# Patient Record
Sex: FEMALE | Race: WHITE | NOT HISPANIC OR LATINO | Employment: UNEMPLOYED | URBAN - METROPOLITAN AREA
[De-identification: names, ages, dates, MRNs, and addresses within clinical notes are randomized per-mention and may not be internally consistent; named-entity substitution may affect disease eponyms.]

---

## 2023-04-28 ENCOUNTER — HOSPITAL ENCOUNTER (INPATIENT)
Facility: HOSPITAL | Age: 33
LOS: 2 days | Discharge: HOME | End: 2023-04-30
Attending: EMERGENCY MEDICINE | Admitting: INTERNAL MEDICINE
Payer: COMMERCIAL

## 2023-04-28 ENCOUNTER — APPOINTMENT (OUTPATIENT)
Dept: RADIOLOGY | Facility: HOSPITAL | Age: 33
End: 2023-04-28
Attending: EMERGENCY MEDICINE
Payer: COMMERCIAL

## 2023-04-28 DIAGNOSIS — J45.901 EXACERBATION OF ASTHMA, UNSPECIFIED ASTHMA SEVERITY, UNSPECIFIED WHETHER PERSISTENT: ICD-10-CM

## 2023-04-28 DIAGNOSIS — J96.01 ACUTE RESPIRATORY FAILURE WITH HYPOXIA (H): ICD-10-CM

## 2023-04-28 PROBLEM — O09.30 SUPERVISION OF HIGH-RISK PREGNANCY WITH INSUFFICIENT PRENATAL CARE: Status: ACTIVE | Noted: 2017-03-30

## 2023-04-28 LAB
ANION GAP SERPL CALCULATED.3IONS-SCNC: 12 MMOL/L (ref 7–15)
ATRIAL RATE - MUSE: 92 BPM
BUN SERPL-MCNC: 8.4 MG/DL (ref 6–20)
CALCIUM SERPL-MCNC: 9.3 MG/DL (ref 8.6–10)
CHLORIDE SERPL-SCNC: 103 MMOL/L (ref 98–107)
CREAT SERPL-MCNC: 0.61 MG/DL (ref 0.51–0.95)
D DIMER PPP FEU-MCNC: 0.41 UG/ML FEU (ref 0–0.5)
DEPRECATED HCO3 PLAS-SCNC: 25 MMOL/L (ref 22–29)
DIASTOLIC BLOOD PRESSURE - MUSE: 107 MMHG
ERYTHROCYTE [DISTWIDTH] IN BLOOD BY AUTOMATED COUNT: 13.2 % (ref 10–15)
FLUAV RNA SPEC QL NAA+PROBE: NEGATIVE
FLUBV RNA RESP QL NAA+PROBE: NEGATIVE
GFR SERPL CREATININE-BSD FRML MDRD: >90 ML/MIN/1.73M2
GLUCOSE SERPL-MCNC: 128 MG/DL (ref 70–99)
HCT VFR BLD AUTO: 48.3 % (ref 35–47)
HGB BLD-MCNC: 15.7 G/DL (ref 11.7–15.7)
INTERPRETATION ECG - MUSE: NORMAL
MCH RBC QN AUTO: 29 PG (ref 26.5–33)
MCHC RBC AUTO-ENTMCNC: 32.5 G/DL (ref 31.5–36.5)
MCV RBC AUTO: 89 FL (ref 78–100)
NT-PROBNP SERPL-MCNC: <36 PG/ML (ref 0–450)
P AXIS - MUSE: 74 DEGREES
PLATELET # BLD AUTO: 261 10E3/UL (ref 150–450)
POTASSIUM SERPL-SCNC: 4 MMOL/L (ref 3.4–5.3)
PR INTERVAL - MUSE: 138 MS
QRS DURATION - MUSE: 72 MS
QT - MUSE: 346 MS
QTC - MUSE: 427 MS
R AXIS - MUSE: 90 DEGREES
RBC # BLD AUTO: 5.42 10E6/UL (ref 3.8–5.2)
RSV RNA SPEC NAA+PROBE: NEGATIVE
SARS-COV-2 RNA RESP QL NAA+PROBE: NEGATIVE
SODIUM SERPL-SCNC: 140 MMOL/L (ref 136–145)
SYSTOLIC BLOOD PRESSURE - MUSE: 179 MMHG
T AXIS - MUSE: 39 DEGREES
VENTRICULAR RATE- MUSE: 92 BPM
WBC # BLD AUTO: 11.8 10E3/UL (ref 4–11)

## 2023-04-28 PROCEDURE — 250N000009 HC RX 250: Performed by: INTERNAL MEDICINE

## 2023-04-28 PROCEDURE — 120N000001 HC R&B MED SURG/OB

## 2023-04-28 PROCEDURE — 96374 THER/PROPH/DIAG INJ IV PUSH: CPT

## 2023-04-28 PROCEDURE — 250N000013 HC RX MED GY IP 250 OP 250 PS 637: Performed by: INTERNAL MEDICINE

## 2023-04-28 PROCEDURE — 80048 BASIC METABOLIC PNL TOTAL CA: CPT | Performed by: EMERGENCY MEDICINE

## 2023-04-28 PROCEDURE — C9803 HOPD COVID-19 SPEC COLLECT: HCPCS

## 2023-04-28 PROCEDURE — 71046 X-RAY EXAM CHEST 2 VIEWS: CPT

## 2023-04-28 PROCEDURE — 250N000011 HC RX IP 250 OP 636: Performed by: EMERGENCY MEDICINE

## 2023-04-28 PROCEDURE — 99285 EMERGENCY DEPT VISIT HI MDM: CPT | Mod: 25,CS

## 2023-04-28 PROCEDURE — 87637 SARSCOV2&INF A&B&RSV AMP PRB: CPT | Performed by: EMERGENCY MEDICINE

## 2023-04-28 PROCEDURE — 93005 ELECTROCARDIOGRAM TRACING: CPT | Performed by: EMERGENCY MEDICINE

## 2023-04-28 PROCEDURE — 250N000011 HC RX IP 250 OP 636: Performed by: INTERNAL MEDICINE

## 2023-04-28 PROCEDURE — 36415 COLL VENOUS BLD VENIPUNCTURE: CPT | Performed by: EMERGENCY MEDICINE

## 2023-04-28 PROCEDURE — 99222 1ST HOSP IP/OBS MODERATE 55: CPT | Performed by: INTERNAL MEDICINE

## 2023-04-28 PROCEDURE — 250N000009 HC RX 250: Performed by: EMERGENCY MEDICINE

## 2023-04-28 PROCEDURE — 94640 AIRWAY INHALATION TREATMENT: CPT | Mod: 76

## 2023-04-28 PROCEDURE — 83880 ASSAY OF NATRIURETIC PEPTIDE: CPT | Performed by: EMERGENCY MEDICINE

## 2023-04-28 PROCEDURE — 999N000157 HC STATISTIC RCP TIME EA 10 MIN

## 2023-04-28 PROCEDURE — 94640 AIRWAY INHALATION TREATMENT: CPT

## 2023-04-28 PROCEDURE — 85379 FIBRIN DEGRADATION QUANT: CPT | Performed by: EMERGENCY MEDICINE

## 2023-04-28 PROCEDURE — 85027 COMPLETE CBC AUTOMATED: CPT | Performed by: EMERGENCY MEDICINE

## 2023-04-28 RX ORDER — PROCHLORPERAZINE 25 MG
25 SUPPOSITORY, RECTAL RECTAL EVERY 12 HOURS PRN
Status: DISCONTINUED | OUTPATIENT
Start: 2023-04-28 | End: 2023-04-30 | Stop reason: HOSPADM

## 2023-04-28 RX ORDER — ACETAMINOPHEN 650 MG/1
650 SUPPOSITORY RECTAL EVERY 6 HOURS PRN
Status: DISCONTINUED | OUTPATIENT
Start: 2023-04-28 | End: 2023-04-30 | Stop reason: HOSPADM

## 2023-04-28 RX ORDER — IPRATROPIUM BROMIDE AND ALBUTEROL SULFATE 2.5; .5 MG/3ML; MG/3ML
3 SOLUTION RESPIRATORY (INHALATION)
Status: DISCONTINUED | OUTPATIENT
Start: 2023-04-28 | End: 2023-04-28

## 2023-04-28 RX ORDER — PREDNISONE 20 MG/1
40 TABLET ORAL
Status: DISCONTINUED | OUTPATIENT
Start: 2023-04-30 | End: 2023-04-29

## 2023-04-28 RX ORDER — IPRATROPIUM BROMIDE AND ALBUTEROL SULFATE 2.5; .5 MG/3ML; MG/3ML
3 SOLUTION RESPIRATORY (INHALATION) ONCE
Status: COMPLETED | OUTPATIENT
Start: 2023-04-28 | End: 2023-04-28

## 2023-04-28 RX ORDER — BISACODYL 10 MG
10 SUPPOSITORY, RECTAL RECTAL DAILY PRN
Status: DISCONTINUED | OUTPATIENT
Start: 2023-04-28 | End: 2023-04-30 | Stop reason: HOSPADM

## 2023-04-28 RX ORDER — ACETAMINOPHEN 325 MG/1
650 TABLET ORAL EVERY 6 HOURS PRN
Status: DISCONTINUED | OUTPATIENT
Start: 2023-04-28 | End: 2023-04-30 | Stop reason: HOSPADM

## 2023-04-28 RX ORDER — PROCHLORPERAZINE MALEATE 10 MG
10 TABLET ORAL EVERY 6 HOURS PRN
Status: DISCONTINUED | OUTPATIENT
Start: 2023-04-28 | End: 2023-04-30 | Stop reason: HOSPADM

## 2023-04-28 RX ORDER — METHYLPREDNISOLONE SODIUM SUCCINATE 125 MG/2ML
125 INJECTION, POWDER, LYOPHILIZED, FOR SOLUTION INTRAMUSCULAR; INTRAVENOUS ONCE
Status: COMPLETED | OUTPATIENT
Start: 2023-04-28 | End: 2023-04-28

## 2023-04-28 RX ORDER — LEVALBUTEROL INHALATION SOLUTION 1.25 MG/3ML
1.25 SOLUTION RESPIRATORY (INHALATION)
Status: DISCONTINUED | OUTPATIENT
Start: 2023-04-28 | End: 2023-04-29

## 2023-04-28 RX ORDER — ENOXAPARIN SODIUM 100 MG/ML
40 INJECTION SUBCUTANEOUS EVERY 24 HOURS
Status: DISCONTINUED | OUTPATIENT
Start: 2023-04-29 | End: 2023-04-30 | Stop reason: HOSPADM

## 2023-04-28 RX ORDER — METHYLPREDNISOLONE SODIUM SUCCINATE 40 MG/ML
40 INJECTION, POWDER, LYOPHILIZED, FOR SOLUTION INTRAMUSCULAR; INTRAVENOUS EVERY 8 HOURS
Status: DISCONTINUED | OUTPATIENT
Start: 2023-04-28 | End: 2023-04-29

## 2023-04-28 RX ORDER — ONDANSETRON 4 MG/1
4 TABLET, ORALLY DISINTEGRATING ORAL EVERY 6 HOURS PRN
Status: DISCONTINUED | OUTPATIENT
Start: 2023-04-28 | End: 2023-04-30 | Stop reason: HOSPADM

## 2023-04-28 RX ORDER — CLONIDINE HYDROCHLORIDE 0.1 MG/1
0.1 TABLET ORAL ONCE
Status: COMPLETED | OUTPATIENT
Start: 2023-04-28 | End: 2023-04-28

## 2023-04-28 RX ORDER — LIDOCAINE 40 MG/G
CREAM TOPICAL
Status: DISCONTINUED | OUTPATIENT
Start: 2023-04-28 | End: 2023-04-30 | Stop reason: HOSPADM

## 2023-04-28 RX ORDER — ONDANSETRON 2 MG/ML
4 INJECTION INTRAMUSCULAR; INTRAVENOUS EVERY 6 HOURS PRN
Status: DISCONTINUED | OUTPATIENT
Start: 2023-04-28 | End: 2023-04-30 | Stop reason: HOSPADM

## 2023-04-28 RX ADMIN — IPRATROPIUM BROMIDE AND ALBUTEROL SULFATE 3 ML: 2.5; .5 SOLUTION RESPIRATORY (INHALATION) at 12:04

## 2023-04-28 RX ADMIN — ACETAMINOPHEN 650 MG: 325 TABLET ORAL at 17:39

## 2023-04-28 RX ADMIN — METHYLPREDNISOLONE SODIUM SUCCINATE 125 MG: 125 INJECTION, POWDER, FOR SOLUTION INTRAMUSCULAR; INTRAVENOUS at 10:01

## 2023-04-28 RX ADMIN — IPRATROPIUM BROMIDE 0.5 MG: 0.5 SOLUTION RESPIRATORY (INHALATION) at 16:07

## 2023-04-28 RX ADMIN — IPRATROPIUM BROMIDE AND ALBUTEROL SULFATE 3 ML: .5; 3 SOLUTION RESPIRATORY (INHALATION) at 10:40

## 2023-04-28 RX ADMIN — IPRATROPIUM BROMIDE AND ALBUTEROL SULFATE 3 ML: .5; 3 SOLUTION RESPIRATORY (INHALATION) at 09:44

## 2023-04-28 RX ADMIN — LEVALBUTEROL HYDROCHLORIDE 1.25 MG: 1.25 SOLUTION RESPIRATORY (INHALATION) at 16:08

## 2023-04-28 RX ADMIN — IPRATROPIUM BROMIDE 0.5 MG: 0.5 SOLUTION RESPIRATORY (INHALATION) at 19:59

## 2023-04-28 RX ADMIN — LEVALBUTEROL HYDROCHLORIDE 1.25 MG: 1.25 SOLUTION RESPIRATORY (INHALATION) at 19:59

## 2023-04-28 RX ADMIN — CLONIDINE HYDROCHLORIDE 0.1 MG: 0.1 TABLET ORAL at 18:41

## 2023-04-28 RX ADMIN — METHYLPREDNISOLONE SODIUM SUCCINATE 40 MG: 40 INJECTION INTRAMUSCULAR; INTRAVENOUS at 17:39

## 2023-04-28 ASSESSMENT — ACTIVITIES OF DAILY LIVING (ADL)
ADLS_ACUITY_SCORE: 35
CHANGE_IN_FUNCTIONAL_STATUS_SINCE_ONSET_OF_CURRENT_ILLNESS/INJURY: NO
ADLS_ACUITY_SCORE: 35
ADLS_ACUITY_SCORE: 16
ADLS_ACUITY_SCORE: 16
WEAR_GLASSES_OR_BLIND: NO
ADLS_ACUITY_SCORE: 35
HEARING_DIFFICULTY_OR_DEAF: NO
DIFFICULTY_COMMUNICATING: NO
TRANSFERRING: 0-->INDEPENDENT
TRANSFERRING: 0-->INDEPENDENT
CONCENTRATING,_REMEMBERING_OR_MAKING_DECISIONS_DIFFICULTY: NO
WALKING_OR_CLIMBING_STAIRS_DIFFICULTY: NO
DOING_ERRANDS_INDEPENDENTLY_DIFFICULTY: NO
FALL_HISTORY_WITHIN_LAST_SIX_MONTHS: NO
ADLS_ACUITY_SCORE: 35
ADLS_ACUITY_SCORE: 35
DIFFICULTY_EATING/SWALLOWING: NO
DRESSING/BATHING_DIFFICULTY: NO
ADLS_ACUITY_SCORE: 35
TOILETING_ISSUES: NO

## 2023-04-28 NOTE — H&P
"[facility]    History and Physical - Hospitalist Service       Date of Admission:  4/28/2023    Assessment & Plan      Amanda Whiting is a 33 year old female admitted on 4/28/2023.  Presented with shortness of breath likely due to asthma exacerbation.  She is requiring supplemental oxygen in the ER.  We will continue steroids and nebs.  Patient counseled to quit smoking.  Chest x-ray is unremarkable.  D-dimer is negative    Acute respiratory failure due to asthma exacerbation  -- Continue supplemental oxygen, steroids and nebs  -- Patient counseled to quit smoking  -- Chest x-ray and D-dimer are unremarkable    Current smoker  --Patient counseled to quit smoking     Diet: Combination Diet Regular Diet Adult  DVT Prophylaxis: Low Risk/Ambulatory with no VTE prophylaxis indicated  Nam Catheter: Not present  Lines: None     Cardiac Monitoring: None  Code Status: Full Code    Clinically Significant Risk Factors Present on Admission                       # Obesity: Estimated body mass index is 32.28 kg/m  as calculated from the following:    Height as of this encounter: 1.676 m (5' 6\").    Weight as of this encounter: 90.7 kg (200 lb).           Disposition Plan      Expected Discharge Date: 04/30/2023                  Yosi Rodriguez MD  Hospitalist Service  [facility]  Securely message with Swivel (more info)  Text page via [employer] Paging/Directory     ______________________________________________________________________    Chief Complaint   Shortness of breath for 3 days    History is obtained from the patient    History of Present Illness   Amanda Whiting is a 33 year old female with known asthma and smoking history not on any medications at home presented with 3-day history of worsening shortness of breath.  Patient was hypoxic in the ER and was put on supplemental oxygen.  Patient is trying to cut down on her smoking.  Patient was given nebs and steroids in the " ER  Chest x-ray was unremarkable for pneumonia.  D-dimer was negative      Past Medical History    Past Medical History:   Diagnosis Date     Uncomplicated asthma        Past Surgical History   History reviewed. No pertinent surgical history.    Prior to Admission Medications   None        Social History   I have reviewed this patient's social history and updated it with pertinent information if needed.        Physical Exam   Vital Signs: Temp: 98.7  F (37.1  C) Temp src: Oral BP: (!) 149/92 Pulse: 104   Resp: 28 SpO2: 93 % O2 Device: Nasal cannula Oxygen Delivery: 2 LPM  Weight: 200 lbs 0 oz    Flat affect  Able to speak full sentences  Bilateral wheezing  S1-S2 normal  No leg edema  Moves all 4 extremities  No skin rash    Medical Decision Making       60 MINUTES SPENT BY ME on the date of service doing chart review, history, exam, documentation & further activities per the note.  MANAGEMENT DISCUSSED with the following over the past 24 hours: Patient   NOTE(S)/MEDICAL RECORDS REVIEWED over the past 24 hours: ER course      Data     I have personally reviewed the following data over the past 24 hrs:    11.8 (H)  \   15.7   / 261     140 103 8.4 /  128 (H)   4.0 25 0.61 \       Trop: N/A BNP: <36       INR:  N/A PTT:  N/A   D-dimer:  0.41 Fibrinogen:  N/A       Imaging results reviewed over the past 24 hrs:   Recent Results (from the past 24 hour(s))   Chest XR,  PA & LAT    Narrative    EXAM: XR CHEST 2 VIEWS  LOCATION: [facility]  DATE/TIME: 4/28/2023 10:28 AM CDT    INDICATION: sob cough  COMPARISON: None.      Impression    IMPRESSION: Lungs are clear. Heart and pulmonary vascularity are normal. No signs of acute disease.

## 2023-04-28 NOTE — PHARMACY-ADMISSION MEDICATION HISTORY
Pharmacist Admission Medication History    Admission medication history is complete. The information provided in this note is only as accurate as the sources available at the time of the update.    Medication reconciliation/reorder completed by provider prior to medication history? No    Information Source(s): Patient via in-person    Pertinent Information:     Changes made to PTA medication list:    Added: None    Deleted: None    Changed: None    Medication Affordability:  Not including over the counter (OTC) medications, was there a time in the past 12 months when you did not take your medications as prescribed because of cost?: No    Allergies reviewed with patient and updates made in EHR: yes    Medication History Completed By: Darcy Griffiths RPH 4/28/2023 12:23 PM    Prior to Admission medications    Not on File

## 2023-04-28 NOTE — PROGRESS NOTES
RESPIRATORY CARE NOTE     Patient Name: Amanda Whiting  Today's Date: 4/28/2023       Pt has been seen for neb treatment and respiratory assessment. BS: fainted wheezes auscultated upper airways and decreased @ bases. Pt was given neb tx as ordered, no undesired effects noted. Post neb tx, pt reported relief. RT will continue to follow and assess as needed.     Raquel Boyle RRT

## 2023-04-28 NOTE — PLAN OF CARE
Goal Outcome Evaluation:      Plan of Care Reviewed With: patient      Problem: Asthma Exacerbation  Goal: Asthma Symptom Relief  Outcome: Progressing   Pt. On room air and states she does not feel short of breath when getting up to the bathroom.   Pt. Declined as needed nicorette.   As needed tylenol requested for headache and chest pain from coughing. When giving tylenol, pt. Then stated that she didn't have any pain.     Tiffany Renteria RN

## 2023-04-28 NOTE — ED TRIAGE NOTES
Pt brought in by EMS. Pt c/o of shortness of breath with dry cough for 2 to 3 days. States she had sore throat prior to having sob. Hx of asthma.     Triage Assessment     Row Name 04/28/23 0824       Triage Assessment (Adult)    Airway WDL WDL       Respiratory WDL    Respiratory WDL cough;rhythm/pattern    Rhythm/Pattern, Respiratory shortness of breath    Cough Frequency frequent    Cough Type dry       Skin Circulation/Temperature WDL    Skin Circulation/Temperature WDL WDL       Cardiac WDL    Cardiac WDL chest pain       Chest Pain Assessment    Chest Pain Location epigastric    Character aching    Precipitating Factors at rest    Chest Pain Intervention 12-lead ECG obtained       Peripheral/Neurovascular WDL    Peripheral Neurovascular WDL WDL       Cognitive/Neuro/Behavioral WDL    Cognitive/Neuro/Behavioral WDL WDL

## 2023-04-28 NOTE — ED PROVIDER NOTES
EMERGENCY DEPARTMENT ENCOUNTER      NAME: Amanda Whiting  AGE: 33 year old female  YOB: 1990  MRN: [id number]  EVALUATION DATE & TIME: 4/28/2023  8:21 AM    PCP: No primary care provider on file.    ED PROVIDER: Feli Milner MD    Chief Complaint   Patient presents with     Shortness of Breath         FINAL IMPRESSION:  1. Acute respiratory failure with hypoxia (H)    2. Exacerbation of asthma, unspecified asthma severity, unspecified whether persistent          ED COURSE & MEDICAL DECISION MAKING:    Pertinent Labs & Imaging studies reviewed. (See chart for details)  33 year old female with history of asthma who presents to the Emergency Department for evaluation of 2 to 3 days of shortness of breath, primarily dry nonproductive cough.  Differential includes viral syndrome, influenza, COVID-19, asthma exacerbation, pneumonia, pulmonary embolism and less likely new onset heart failure, symptomatic anemia or arrhythmia.    Patient placed on monitor, IV established and blood obtained.  Twelve-lead EKG shows sinus rhythm.  No ischemic changes.  Patient given Solu-Medrol, nebs x3.  With this, became hypoxic 88%.  Requiring 2 L nasal cannula.  COVID/influenza/RSV swab negative.  CBC, BMP, D-dimer and BNP unremarkable.  Chest x-ray unremarkable without infiltrate.  Patient had frankly little improvement with the above nebs, still symptomatic and hypoxic and warrants admission for further management of asthma exacerbation and hypoxic respiratory failure.      ED Course as of 04/28/23 1148   Fri Apr 28, 2023   0825 8:25 AM I met with the patient to obtain patient history and performed a physical exam. Discussed plan for ED work up including potential diagnostic studies and interventions.   0830 Attempted to reach motherIdania via phone to get more details about family history of possible clotting.  Did not answer, unable to leave voicemail.   0924 D-Dimer Quantitative: 0.41   1019 SpO2(!): 88 %   1034  Chest XR,  PA & LAT  Chest x-ray reviewed by myself.  No cardiomegaly, pleural effusion, infiltrate   1103 David hypoxic after nebs.  No significant improvement.  No beds in system, will board here.   1127 11:27 AM Spoke with hospitalist Dr. Rodriguez who accepts patient for admission.       Medical Decision Making    History:    Supplemental history from: EMS    External Record(s) reviewed: [facility] Record: [facility]    Work Up:    Chart documentation includes differential considered and any EKGs or imaging independently interpreted by provider, where specified.    In additional to work up documented, I considered the following work up: na    External consultation:    Discussion of management with another provider: Hospitalist Dr. Rodriguez    Complicating factors:    Care impacted by chronic illness: Chronic Lung Disease    Care affected by social determinants of health: Access to Medical Care    Disposition considerations: Admit.        At the conclusion of the encounter I discussed the results of all of the tests and the disposition. The questions were answered. The patient or family acknowledged understanding and was agreeable with the care plan.    CRITICAL CARE:  Critical Care  Performed by: Feli Milner MD  Authorized by: Feli Milner MD     Total critical care time: 42 minutes  Criticalcare time was exclusive of separately billable procedures and treating other patients.  Critical care was necessary to treat or prevent imminent or life-threatening deterioration of the following conditions: Hypoxic respiratory failure  Critical care was time spent personally by me on the following activities: development of treatment plan with patient or surrogate, discussions with consultants, examination of patient, evaluation of patient's response totreatment, obtaining history from patient or surrogate, ordering and performing treatments and interventions, ordering and review of laboratory studies, ordering  and review of radiographic studies and re-evaluation ofpatient's condition, this excludes any separately billable procedures.      MEDICATIONS GIVEN IN THE EMERGENCY:  Medications   ipratropium - albuterol 0.5 mg/2.5 mg/3 mL (DUONEB) neb solution 3 mL (has no administration in time range)   ipratropium - albuterol 0.5 mg/2.5 mg/3 mL (DUONEB) neb solution 3 mL (3 mLs Nebulization $Given 4/28/23 0966)   methylPREDNISolone sodium succinate (solu-MEDROL) injection 125 mg (125 mg Intravenous $Given 4/28/23 1001)   ipratropium - albuterol 0.5 mg/2.5 mg/3 mL (DUONEB) neb solution 3 mL (3 mLs Nebulization $Given 4/28/23 1040)       NEW PRESCRIPTIONS STARTED AT TODAY'S ER VISIT  New Prescriptions    No medications on file          =================================================================    HPI    Patient information was obtained from: patient    Use of Intrepreter: N/A        Amanda Whiting is a 33 year old female with pertinent medical history of uncomplicated asthma who presents shortness of breath.    Patient reports worsening shortness of breath for the past 3 days. She states that the shortness of breath started with sore throat. She says it hurts with inspiration. She also associates cough with production of phlegm (yellow and clear) and congestion.    She denies associating hemoptysis, fever, and chills. She denies recent sick contacts. She has no IUD. She has no personal history of blood clots or bleeding disorders. Her children are at school age.    There were no other concerns/complaints at this time.    FHx: Her mother has history of blood clots.      PAST MEDICAL HISTORY:  Past Medical History:   Diagnosis Date     Uncomplicated asthma        PAST SURGICAL HISTORY:  History reviewed. No pertinent surgical history.    CURRENT MEDICATIONS:    None       ALLERGIES:  Allergies   Allergen Reactions     Bee Pollens [Bee Pollen] Unknown     Bee Venom      BREATHING DIFFICULTY     Penicillins Unknown  "      FAMILY HISTORY:  History reviewed. No pertinent family history.    SOCIAL HISTORY:        VITALS:  Patient Vitals for the past 24 hrs:   BP Temp Temp src Pulse Resp SpO2 Height Weight   04/28/23 1133 -- -- -- 98 27 92 % -- --   04/28/23 1130 (!) 149/94 -- -- 96 23 92 % -- --   04/28/23 1055 -- -- -- 113 29 92 % -- --   04/28/23 1045 138/82 -- -- 104 -- -- -- --   04/28/23 1044 -- -- -- 103 -- 91 % -- --   04/28/23 1043 -- -- -- 104 22 96 % -- --   04/28/23 1034 -- -- -- 90 13 94 % -- --   04/28/23 1020 -- -- -- 98 17 92 % -- --   04/28/23 1015 (!) 164/115 -- -- 92 23 (!) 88 % -- --   04/28/23 1000 (!) 163/119 -- -- 97 23 91 % -- --   04/28/23 0955 -- -- -- 97 28 94 % -- --   04/28/23 0952 -- -- -- 87 21 100 % -- --   04/28/23 0944 (!) 164/118 -- -- 93 22 92 % -- --   04/28/23 0930 (!) 150/112 -- -- 91 26 91 % -- --   04/28/23 0915 (!) 152/105 -- -- 98 29 91 % -- --   04/28/23 0900 (!) 151/106 -- -- 89 20 91 % -- --   04/28/23 0823 (!) 179/107 98.7  F (37.1  C) Oral 96 20 93 % 1.676 m (5' 6\") 90.7 kg (200 lb)       PHYSICAL EXAM    General Appearance: Well-appearing, well-nourished, no acute distress   ENT:  Lips, mucosa, and tongue normal; membranes are moist without pallor. No tonsillar swelling, erythema, or exudates.  Chest:  No tenderness or deformity, no crepitus  Cardio: Hypertensive. Regular rhythm, no murmur/gallop/rub, 2+ pulses symmetric in all extremities  Pulm: Borderline hypoxic in 90-93%, harsh cough, minimal wheezing.  Mild respiratory distress  Abdomen:  Soft, obese  Extremities:  Extremities normal, atraumatic, no cyanosis or no peripheral edema, full ROM and motor tone intact, bilateral pulses intact upper and lower  Skin:  Skin warm, dry, no rashes  Neuro:  Alert and oriented ×3     RADIOLOGY/LABS:  Reviewed all pertinent imaging. Please see official radiology report. All pertinent labs reviewed and interpreted.    Results for orders placed or performed during the hospital encounter of " 04/28/23   Chest XR,  PA & LAT    Impression    IMPRESSION: Lungs are clear. Heart and pulmonary vascularity are normal. No signs of acute disease.   Symptomatic Influenza A/B, RSV, & SARS-CoV2 PCR (COVID-19) Nasopharyngeal    Specimen: Nasopharyngeal; Swab   Result Value Ref Range    Influenza A PCR Negative Negative    Influenza B PCR Negative Negative    RSV PCR Negative Negative    SARS CoV2 PCR Negative Negative   CBC (+ platelets, no diff)   Result Value Ref Range    WBC Count 11.8 (H) 4.0 - 11.0 10e3/uL    RBC Count 5.42 (H) 3.80 - 5.20 10e6/uL    Hemoglobin 15.7 11.7 - 15.7 g/dL    Hematocrit 48.3 (H) 35.0 - 47.0 %    MCV 89 78 - 100 fL    MCH 29.0 26.5 - 33.0 pg    MCHC 32.5 31.5 - 36.5 g/dL    RDW 13.2 10.0 - 15.0 %    Platelet Count 261 150 - 450 10e3/uL   Basic metabolic panel   Result Value Ref Range    Sodium 140 136 - 145 mmol/L    Potassium 4.0 3.4 - 5.3 mmol/L    Chloride 103 98 - 107 mmol/L    Carbon Dioxide (CO2) 25 22 - 29 mmol/L    Anion Gap 12 7 - 15 mmol/L    Urea Nitrogen 8.4 6.0 - 20.0 mg/dL    Creatinine 0.61 0.51 - 0.95 mg/dL    Calcium 9.3 8.6 - 10.0 mg/dL    Glucose 128 (H) 70 - 99 mg/dL    GFR Estimate >90 >60 mL/min/1.73m2   D dimer quantitative   Result Value Ref Range    D-Dimer Quantitative 0.41 0.00 - 0.50 ug/mL FEU   Nt probnp inpatient   Result Value Ref Range    N terminal Pro BNP Inpatient <36 0 - 450 pg/mL   ECG 12-LEAD WITH MUSE (LHE)   Result Value Ref Range    Systolic Blood Pressure 179 mmHg    Diastolic Blood Pressure 107 mmHg    Ventricular Rate 92 BPM    Atrial Rate 92 BPM    OK Interval 138 ms    QRS Duration 72 ms     ms    QTc 427 ms    P Axis 74 degrees    R AXIS 90 degrees    T Axis 39 degrees    Interpretation ECG       Sinus rhythm  Rightward axis  Borderline ECG  No previous ECGs available  Confirmed by SEE ED PROVIDER NOTE FOR, ECG INTERPRETATION ([id number]),  SIENA RINCON ([id number]) on 4/28/2023 11:03:52 AM         EKG:  Performed at: 4/28/2023  08:24:10    Impression: Sinus rhythm, Rightward axis, Borderline ECG    Rate: 92  Rhythm: Sinus Rhythm  Axis: 90  WI Interval: 138  QRS Interval: 72  QTc Interval: 427  ST Changes: None  Comparison: No previous ECG for comparison  I have independently reviewed and interpreted the EKG(s) documented above.      The creation of this record is based on the scribe s observations of the work being performed by Feli Milner MD and the provider s statements to them. It was created on her behalf by Jennifer Kuo, a trained medical scribe. This document has been checked and approved by the attending provider.    Feli Milner MD  Emergency Medicine  [facility] EMERGENCY DEPARTMENT  [facility] [facility] [id number] [phone]  Dept: [phone]     Feli Milner MD  04/28/23 7028

## 2023-04-28 NOTE — ED NOTES
Bed: [place] [address]  Expected date:   Expected time:   Means of arrival: Ambulance  Comments:  SPF: SOB, cough, sore throat

## 2023-04-29 PROCEDURE — 250N000012 HC RX MED GY IP 250 OP 636 PS 637: Performed by: INTERNAL MEDICINE

## 2023-04-29 PROCEDURE — 120N000001 HC R&B MED SURG/OB

## 2023-04-29 PROCEDURE — 999N000157 HC STATISTIC RCP TIME EA 10 MIN

## 2023-04-29 PROCEDURE — 94640 AIRWAY INHALATION TREATMENT: CPT | Mod: 76

## 2023-04-29 PROCEDURE — 250N000013 HC RX MED GY IP 250 OP 250 PS 637: Performed by: INTERNAL MEDICINE

## 2023-04-29 PROCEDURE — 250N000011 HC RX IP 250 OP 636: Performed by: INTERNAL MEDICINE

## 2023-04-29 PROCEDURE — 99232 SBSQ HOSP IP/OBS MODERATE 35: CPT | Performed by: INTERNAL MEDICINE

## 2023-04-29 PROCEDURE — 250N000009 HC RX 250: Performed by: INTERNAL MEDICINE

## 2023-04-29 RX ORDER — LEVALBUTEROL INHALATION SOLUTION 1.25 MG/3ML
1.25 SOLUTION RESPIRATORY (INHALATION)
Status: DISCONTINUED | OUTPATIENT
Start: 2023-04-29 | End: 2023-04-29

## 2023-04-29 RX ORDER — LEVALBUTEROL INHALATION SOLUTION 1.25 MG/3ML
1.25 SOLUTION RESPIRATORY (INHALATION) 3 TIMES DAILY
Status: DISCONTINUED | OUTPATIENT
Start: 2023-04-29 | End: 2023-04-30 | Stop reason: HOSPADM

## 2023-04-29 RX ORDER — PREDNISONE 20 MG/1
40 TABLET ORAL DAILY
Status: DISCONTINUED | OUTPATIENT
Start: 2023-04-29 | End: 2023-04-30 | Stop reason: HOSPADM

## 2023-04-29 RX ADMIN — METHYLPREDNISOLONE SODIUM SUCCINATE 40 MG: 40 INJECTION INTRAMUSCULAR; INTRAVENOUS at 03:32

## 2023-04-29 RX ADMIN — PREDNISONE 40 MG: 20 TABLET ORAL at 12:28

## 2023-04-29 RX ADMIN — IPRATROPIUM BROMIDE 0.5 MG: 0.5 SOLUTION RESPIRATORY (INHALATION) at 09:23

## 2023-04-29 RX ADMIN — LEVALBUTEROL HYDROCHLORIDE 1.25 MG: 1.25 SOLUTION RESPIRATORY (INHALATION) at 20:21

## 2023-04-29 RX ADMIN — LEVALBUTEROL HYDROCHLORIDE 1.25 MG: 1.25 SOLUTION RESPIRATORY (INHALATION) at 09:23

## 2023-04-29 RX ADMIN — ACETAMINOPHEN 650 MG: 325 TABLET ORAL at 16:42

## 2023-04-29 RX ADMIN — ACETAMINOPHEN 650 MG: 325 TABLET ORAL at 08:13

## 2023-04-29 RX ADMIN — ENOXAPARIN SODIUM 40 MG: 40 INJECTION SUBCUTANEOUS at 08:13

## 2023-04-29 RX ADMIN — LEVALBUTEROL HYDROCHLORIDE 1.25 MG: 1.25 SOLUTION RESPIRATORY (INHALATION) at 14:01

## 2023-04-29 ASSESSMENT — ACTIVITIES OF DAILY LIVING (ADL)
ADLS_ACUITY_SCORE: 16
DEPENDENT_IADLS:: TRANSPORTATION
ADLS_ACUITY_SCORE: 16

## 2023-04-29 NOTE — DISCHARGE INSTRUCTIONS
Appointment for follow up   [place] [address]  [phone]  5/23/23 at 2:40PM with Sandra Marte  Please call and cancel if you cannot make this appointment.  Thanks Care Management

## 2023-04-29 NOTE — PROGRESS NOTES
"RESPIRATORY CARE NOTE    Xopenex and Atrovent given X2 on schedule.  Breath sounds: scattered expiratory wheezes; increased air movement post Tx.  Peak flow 220 pre and post Tx.  Pt is on room air.      Blood pressure (!) 170/112, pulse 84, temperature 98.4  F (36.9  C), temperature source Oral, resp. rate 20, height 1.702 m (5' 7\"), weight 102 kg (224 lb 13.9 oz), SpO2 91 %.    Paul Aguirre, RT    "

## 2023-04-29 NOTE — CONSULTS
Care Management Initial Consult    General Information  Assessment completed with: Patient,         Primary Care Provider verified and updated as needed: Yes   Readmission within the last 30 days: no previous admission in last 30 days      Reason for Consult: discharge planning  Advance Care Planning:            Communication Assessment  Patient's communication style: spoken language (English or Bilingual)    Hearing Difficulty or Deaf: no   Wear Glasses or Blind: no    Cognitive  Cognitive/Neuro/Behavioral: WDL                      Living Environment:   People in home: other (see comments)     Current living Arrangements: homeless, house      Able to return to prior arrangements: yes       Family/Social Support:  Care provided by: self  Provides care for: no one                Description of Support System:           Current Resources:   Patient receiving home care services: No     Community Resources: None  Equipment currently used at home: none  Supplies currently used at home: None    Employment/Financial:  Employment Status: unemployed        Financial Concerns: insurance, none, unemployed   Referral to Financial Worker: Yes       Does the patient's insurance plan have a 3 day qualifying hospital stay waiver?  No    Lifestyle & Psychosocial Needs:  Social Determinants of Health     Tobacco Use: Not on file   Alcohol Use: Not on file   Financial Resource Strain: Not on file   Food Insecurity: Not on file   Transportation Needs: Not on file   Physical Activity: Not on file   Stress: Not on file   Social Connections: Not on file   Intimate Partner Violence: Not on file   Depression: Not on file   Housing Stability: Not on file       Functional Status:  Prior to admission patient needed assistance:   Dependent ADLs:: Independent  Dependent IADLs:: Transportation       Mental Health Status:          Chemical Dependency Status:                Values/Beliefs:  Spiritual, Cultural Beliefs, Buddhism Practices, Values  that affect care:                 Additional Information:  Assessed with pt. She is currently staying with her mom in [facility], however she has periods of homelessness. She has 3 kids ages 5,10,12 that are under the care of her mother. Reports no health insurance and reports is going to fill out the MA application. Plan is to discharge to her mothers house. Declines any chemical dependency history or needs/resources. Declines any mental health resources or needs.     Provider requested we set up follow up appointment for patient.  CM discussed this with pt and she was in agreement. CM looked though resources for low cost clinics in pt area and found one 2 miles from pt mothers home. Called and scheduled an appointment for the pt. Pt is accepting of this, directions given and pt understands if she cannot make the appointment she needs to call and cancel.    Appointment for follow up   [place] [address] [address] in [place]  [phone]  5/23/23 at 2:40PM with Sandra Marte  Please call and cancel if you cannot make this appointment.    RNCM to follow for medical progression, recommendations, and final discharge plan.        Joelle Carranza RN

## 2023-04-29 NOTE — PLAN OF CARE
Goal Outcome Evaluation:    Pt c/o of headache 7/10. Tylenol administered bringing pain down to a 5/10. Pt A&O x4, Independent in the room. Infrequent congested non productive cough observed. Pt reports some SOB. Transitioned to PO steroids. VSS RA      Problem: Plan of Care - These are the overarching goals to be used throughout the patient stay.    Goal: Absence of Hospital-Acquired Illness or Injury  Intervention: Prevent Skin Injury  Recent Flowsheet Documentation  Taken 4/29/2023 0808 by Yany Armendariz RN  Body Position: position changed independently     Problem: Plan of Care - These are the overarching goals to be used throughout the patient stay.    Goal: Optimal Comfort and Wellbeing  Outcome: Progressing     Problem: Asthma Exacerbation  Goal: Asthma Symptom Relief  Outcome: Progressing     Problem: Asthma Exacerbation  Goal: Asthma Symptom Relief  Intervention: Support Asthma Symptom Control  Recent Flowsheet Documentation  Taken 4/29/2023 0808 by Yany Armendariz, RN  Medication Review/Management: medications reviewed           Plan of Care Reviewed With: patient    Overall Patient Progress: improvingOverall Patient Progress: improving

## 2023-04-29 NOTE — PROGRESS NOTES
"[facility]    Medicine Progress Note - Hospitalist Service    Date of Admission:  4/28/2023    Assessment & Plan      Amanda Whiting is a 33 year old female admitted on 4/28/2023.  Presented with shortness of breath likely due to asthma exacerbation.  She is requiring supplemental oxygen in the ER.  We will continue steroids and nebs.  Patient counseled to quit smoking.  Chest x-ray is unremarkable.  D-dimer is negative    Acute respiratory failure due to asthma exacerbation  --Improving  -- Continue supplemental oxygen, IV steroids and nebs.  Switch to p.o. steroids today.  Switch nebs to every 6 hours.  -- Patient counseled to quit smoking  -- Chest x-ray and D-dimer are unremarkable    Accelerated hypertension due to steroids  -- Did receive p.o. clonidine last night  -- Switch to p.o. steroids today    Current smoker  --Patient counseled to quit smoking       Diet: Combination Diet Regular Diet Adult    DVT Prophylaxis: Enoxaparin (Lovenox) SQ  Nam Catheter: Not present  Lines: None     Cardiac Monitoring: None  Code Status: Full Code      Clinically Significant Risk Factors                        # Obesity: Estimated body mass index is 35.22 kg/m  as calculated from the following:    Height as of this encounter: 1.702 m (5' 7\").    Weight as of this encounter: 102 kg (224 lb 13.9 oz)., PRESENT ON ADMISSION         Disposition Plan     Expected Discharge Date: 04/30/2023      Destination: home with family            Yosi Rodriguez MD  Hospitalist Service  [facility]  Securely message with [employer] (more info)  Text page via [employer] Paging/Directory   ______________________________________________________________________    Interval History   Patient is off supplemental oxygen.  Blood pressure was high.  Does not take inhaled steroids or sees a primary doctor.  Discussed with  about financial resources.  She is able to walk to the bathroom without " shortness of breath    Physical Exam   Vital Signs: Temp: 97.7  F (36.5  C) Temp src: Oral BP: 138/84 Pulse: 90   Resp: 18 SpO2: 91 % O2 Device: None (Room air) Oxygen Delivery: 2 LPM  Weight: 224 lbs 13.91 oz    Does not appear to be in distress  Minimal wheezing at the bases  Able to speak full sentences  Medical Decision Making       35 MINUTES SPENT BY ME on the date of service doing chart review, history, exam, documentation & further activities per the note.  MANAGEMENT DISCUSSED with the following over the past 24 hours: Patient   NOTE(S)/MEDICAL RECORDS REVIEWED over the past 24 hours: Nursing notes      Data         Imaging results reviewed over the past 24 hrs:   No results found for this or any previous visit (from the past 24 hour(s)).

## 2023-04-29 NOTE — PLAN OF CARE
Problem: Plan of Care - These are the overarching goals to be used throughout the patient stay.    Goal: Absence of Hospital-Acquired Illness or Injury  Intervention: Identify and Manage Fall Risk  Recent Flowsheet Documentation  Taken 4/29/2023 1642 by Eileen Kevin RN  Safety Promotion/Fall Prevention: nonskid shoes/slippers when out of bed     Problem: Plan of Care - These are the overarching goals to be used throughout the patient stay.    Goal: Absence of Hospital-Acquired Illness or Injury  Intervention: Prevent Skin Injury  Recent Flowsheet Documentation  Taken 4/29/2023 1642 by Eileen Kevin RN  Body Position: position changed independently     Problem: Asthma Exacerbation  Goal: Asthma Symptom Relief  Intervention: Support Asthma Symptom Control  Recent Flowsheet Documentation  Taken 4/29/2023 1642 by Eileen Kevni RN  Medication Review/Management: medications reviewed   Goal Outcome Evaluation:       Pt is progressing with these goals.  Pt states that her symptoms have improved since admit.  VSS.  Will continue to monitor.

## 2023-04-29 NOTE — PLAN OF CARE
"  Problem: Asthma Exacerbation  Goal: Asthma Symptom Relief  Outcome: Progressing  Intervention: Support Asthma Symptom Control  Recent Flowsheet Documentation  Taken 4/28/2023 2235 by Meg Hernandez, RN  Medication Review/Management: medications reviewed   Goal Outcome Evaluation: Patient aox4. Ambulating independently. Voiding w/o concerns. Sats 93 on RA. BP does remain elevated at this time. Lung sounds diminished posteriorly and has an infrequent non productive cough. Did deny any chest pain and sob overnight but reports having a 'scratchy throat\" after receiving her last scheduled  neb. Also receiving IV steroids.                     "

## 2023-04-29 NOTE — PROGRESS NOTES
Report given to nurse on P1. Pt. Transferred to floor by wheelchair with belongings.     Tiffany Renteria RN

## 2023-04-30 VITALS
RESPIRATION RATE: 18 BRPM | HEIGHT: 67 IN | BODY MASS INDEX: 35.36 KG/M2 | DIASTOLIC BLOOD PRESSURE: 91 MMHG | OXYGEN SATURATION: 94 % | WEIGHT: 225.31 LBS | TEMPERATURE: 97.8 F | HEART RATE: 72 BPM | SYSTOLIC BLOOD PRESSURE: 126 MMHG

## 2023-04-30 PROCEDURE — 250N000009 HC RX 250: Performed by: INTERNAL MEDICINE

## 2023-04-30 PROCEDURE — 250N000013 HC RX MED GY IP 250 OP 250 PS 637: Performed by: INTERNAL MEDICINE

## 2023-04-30 PROCEDURE — 250N000012 HC RX MED GY IP 250 OP 636 PS 637: Performed by: INTERNAL MEDICINE

## 2023-04-30 PROCEDURE — 999N000157 HC STATISTIC RCP TIME EA 10 MIN

## 2023-04-30 PROCEDURE — 94640 AIRWAY INHALATION TREATMENT: CPT | Mod: 76

## 2023-04-30 PROCEDURE — G0463 HOSPITAL OUTPT CLINIC VISIT: HCPCS

## 2023-04-30 PROCEDURE — 99239 HOSP IP/OBS DSCHRG MGMT >30: CPT | Performed by: INTERNAL MEDICINE

## 2023-04-30 RX ORDER — ALBUTEROL SULFATE 90 UG/1
2 AEROSOL, METERED RESPIRATORY (INHALATION) EVERY 6 HOURS PRN
Qty: 18 G | Refills: 1 | Status: SHIPPED | OUTPATIENT
Start: 2023-04-30

## 2023-04-30 RX ORDER — PREDNISONE 10 MG/1
TABLET ORAL
Qty: 30 TABLET | Refills: 0 | Status: SHIPPED | OUTPATIENT
Start: 2023-04-30 | End: 2023-05-12

## 2023-04-30 RX ORDER — FLUTICASONE PROPIONATE 50 MCG
2 BLISTER, WITH INHALATION DEVICE INHALATION EVERY 12 HOURS
Qty: 60 EACH | Refills: 1 | Status: SHIPPED | OUTPATIENT
Start: 2023-05-12

## 2023-04-30 RX ADMIN — ACETAMINOPHEN 650 MG: 325 TABLET ORAL at 00:08

## 2023-04-30 RX ADMIN — PREDNISONE 40 MG: 20 TABLET ORAL at 10:44

## 2023-04-30 RX ADMIN — LEVALBUTEROL HYDROCHLORIDE 1.25 MG: 1.25 SOLUTION RESPIRATORY (INHALATION) at 08:46

## 2023-04-30 ASSESSMENT — ACTIVITIES OF DAILY LIVING (ADL)
ADLS_ACUITY_SCORE: 16

## 2023-04-30 NOTE — PLAN OF CARE
Problem: Plan of Care - These are the overarching goals to be used throughout the patient stay.    Goal: Plan of Care Review  Description: The Plan of Care Review/Shift note should be completed every shift.  The Outcome Evaluation is a brief statement about your assessment that the patient is improving, declining, or no change.  This information will be displayed automatically on your shift note.  Outcome: Adequate for Care Transition   Goal Outcome Evaluation:             Patient continues to progress towards goals.   VSS.     Transitioned to oral steroids yesterday.   Scheduled nebs. Denies sob and remains on RA, maintaining sats >90.     MD has ordered for the pt to go home this afternoon. She is nervous about going home today.   Her affect is very flat and she stated that she feels worse today then yesterday. When this writer asked the pt why she had not communicated this to the MD when he was in the room with her this am, she stated that she didn't know why.    Pt will have a cab voucher ride when we are able to set that up. She is going to her moms house she says where her kids are bc she is homeless.    Pharm called and stated that they do not have any pulmoncort in stock and needed MD to approve a substitute. This writer contacted the MD and he sent a new order for the substitute. Will await medications arrival from the pharmacy so pt can discharge.    PIV will be removed upon discharge to home.    Did discuss with the care manager pt's demeanor and that she has a very flat affect as well as seems depressed. She stated that she would add care coordinator to check in on the pt after discharge.    Will cont to monitor this pt until she discharges.

## 2023-04-30 NOTE — PLAN OF CARE
Problem: Plan of Care - These are the overarching goals to be used throughout the patient stay.    Goal: Optimal Comfort and Wellbeing  Outcome: Progressing  Intervention: Monitor Pain and Promote Comfort  Recent Flowsheet Documentation  Taken 4/29/2023 1918 by Meg Hernandez RN  Pain Management Interventions: medication (see MAR)     Problem: Asthma Exacerbation  Goal: Asthma Symptom Relief  Outcome: Progressing   Goal Outcome Evaluation: Patient continues to progress towards goals. VSS.   Transitioned to oral steroids yesterday. Scheduled nebs. Denies sob and remains on RA, maintaining sats >90. Showered last night. Complained of HA overnight, PRN tylenol given with some relief.

## 2023-04-30 NOTE — PROGRESS NOTES
Care Management Discharge Note    Discharge Date: 04/30/2023       Discharge Disposition:  home    Discharge Services:      Discharge DME:      Discharge Transportation: family or friend will provide    Private pay costs discussed: Not applicable    PAS Confirmation Code:    Patient/family educated on Medicare website which has current facility and service quality ratings:      Education Provided on the Discharge Plan:    Persons Notified of Discharge Plans: yes  Patient/Family in Agreement with the Plan:      Handoff Referral Completed: Yes    Additional Information:  Pt adequate for discharge. Nursing to let CM know when pt is ready and CM to call for a cab.        Joelle Carranza RN

## 2023-04-30 NOTE — DISCHARGE SUMMARY
[facility]  Hospitalist Discharge Summary      Date of Admission:  4/28/2023  Date of Discharge:  4/30/2023  Discharging Provider: Yosi Rodriguez MD  Discharge Service: Hospitalist Service    Discharge Diagnoses   Acute exacerbation of asthma  Smoking    Follow-ups Needed After Discharge   Follow-up Appointments     Follow-up and recommended labs and tests       Follow up with primary care provider, Estuardo Flores, within 7 days for   hospital follow- up.  No follow up labs or test are needed.         As given below    Unresulted Labs Ordered in the Past 30 Days of this Admission     No orders found from 3/29/2023 to 4/29/2023.      These results will be followed up by none    Discharge Disposition   Discharged to home  Condition at discharge: Stable      Hospital Course   Amanda Whiting is a 33 year old female admitted on 4/28/2023.  Presented with shortness of breath likely due to asthma exacerbation.  She is requiring supplemental oxygen in the ER.  We will continue steroids and nebs.  Patient counseled to quit smoking.  Chest x-ray is unremarkable.  D-dimer is negative    Acute respiratory failure due to asthma exacerbation  --Improving.  Patient was able to ambulate to the bathroom without shortness of breath.  -- Initially treated with IV steroids and then switched to p.o. steroids.  Continue steroid taper as given below.  Switch to Pulmicort inhaler when done with p.o. steroids.  Rescue inhaler prescribed.  Follow-up with PCP  -- Patient counseled to quit smoking  -- Chest x-ray and D-dimer are unremarkable    Accelerated hypertension due to steroids  -- Improved with switching to p.o. steroids     Current smoker  --Patient counseled to quit smoking    Social  -- Patient has no insurance and no PCP.  Free clinic information given as per .  Taxi Voucher will be given for home transport.      Consultations This Hospital Stay   RESPIRATORY CARE IP CONSULT  CARE MANAGEMENT /  SOCIAL WORK IP CONSULT    Code Status   Full Code    Time Spent on this Encounter   I, Yosi Rodriguez MD, personally saw the patient today and spent greater than 30 minutes discharging this patient.       Yosi Rodriguez MD  [address] [address]  Phone: [phone]  Fax: [phone]  ______________________________________________________________________    Physical Exam   Vital Signs: Temp: 97.8  F (36.6  C) Temp src: Oral BP: (!) 126/91 Pulse: 72   Resp: 18 SpO2: 94 % O2 Device: None (Room air)    Weight: 225 lbs 5 oz  Patient is able to speak full sentences  Minimal wheezing  Flat affect       Primary Care Physician   Estuardo Flores    Discharge Orders      Follow-up and recommended labs and tests     Follow up with primary care provider, Estuardo Flores, within 7 days for hospital follow- up.  No follow up labs or test are needed.     Activity    Your activity upon discharge: activity as tolerated     Discharge Instructions    Quit smoking     Diet    Follow this diet upon discharge: Orders Placed This Encounter      Combination Diet Regular Diet Adult       Significant Results and Procedures   Results for orders placed or performed during the hospital encounter of 04/28/23   Chest XR,  PA & LAT    Narrative    EXAM: XR CHEST 2 VIEWS  LOCATION: [facility]  DATE/TIME: 4/28/2023 10:28 AM CDT    INDICATION: sob cough  COMPARISON: None.      Impression    IMPRESSION: Lungs are clear. Heart and pulmonary vascularity are normal. No signs of acute disease.       Discharge Medications   Current Discharge Medication List      START taking these medications    Details   albuterol (PROAIR HFA/PROVENTIL HFA/VENTOLIN HFA) 108 (90 Base) MCG/ACT inhaler Inhale 2 puffs into the lungs every 6 hours as needed for shortness of breath, wheezing or cough  Qty: 18 g, Refills: 1    Comments: Pharmacy may dispense brand covered by insurance (Proair, or  proventil or ventolin or generic albuterol inhaler)  Associated Diagnoses: Exacerbation of asthma, unspecified asthma severity, unspecified whether persistent      budesonide (PULMICORT FLEXHALER) 90 MCG/ACT inhaler Inhale 2 puffs into the lungs 2 times daily  Qty: 1 each, Refills: 1    Associated Diagnoses: Exacerbation of asthma, unspecified asthma severity, unspecified whether persistent      predniSONE (DELTASONE) 10 MG tablet Take 4 tablets (40 mg) by mouth daily for 3 days, THEN 3 tablets (30 mg) daily for 3 days, THEN 2 tablets (20 mg) daily for 3 days, THEN 1 tablet (10 mg) daily for 3 days.  Qty: 30 tablet, Refills: 0    Associated Diagnoses: Exacerbation of asthma, unspecified asthma severity, unspecified whether persistent           Allergies   Allergies   Allergen Reactions     Bee Pollens [Bee Pollen] Unknown     Bee Venom      BREATHING DIFFICULTY     Penicillins Unknown

## 2023-12-05 ENCOUNTER — HOSPITAL ENCOUNTER (EMERGENCY)
Facility: HOSPITAL | Age: 33
Discharge: LEFT BEFORE DISCHARGE | End: 2023-12-05
Attending: EMERGENCY MEDICINE | Admitting: EMERGENCY MEDICINE

## 2023-12-05 VITALS
WEIGHT: 225 LBS | HEART RATE: 85 BPM | DIASTOLIC BLOOD PRESSURE: 102 MMHG | TEMPERATURE: 96.9 F | OXYGEN SATURATION: 95 % | BODY MASS INDEX: 35.31 KG/M2 | RESPIRATION RATE: 18 BRPM | SYSTOLIC BLOOD PRESSURE: 170 MMHG | HEIGHT: 67 IN

## 2023-12-05 DIAGNOSIS — R10.9 CHRONIC ABDOMINAL PAIN: ICD-10-CM

## 2023-12-05 DIAGNOSIS — R07.9 CHEST PAIN, UNSPECIFIED TYPE: ICD-10-CM

## 2023-12-05 DIAGNOSIS — G89.29 CHRONIC ABDOMINAL PAIN: ICD-10-CM

## 2023-12-05 LAB
ALBUMIN SERPL BCG-MCNC: 4.3 G/DL (ref 3.5–5.2)
ALP SERPL-CCNC: 91 U/L (ref 40–150)
ALT SERPL W P-5'-P-CCNC: 131 U/L (ref 0–50)
ANION GAP SERPL CALCULATED.3IONS-SCNC: 12 MMOL/L (ref 7–15)
AST SERPL W P-5'-P-CCNC: 171 U/L (ref 0–45)
BILIRUB DIRECT SERPL-MCNC: <0.2 MG/DL (ref 0–0.3)
BILIRUB SERPL-MCNC: 0.4 MG/DL
BUN SERPL-MCNC: 5.8 MG/DL (ref 6–20)
CALCIUM SERPL-MCNC: 9.2 MG/DL (ref 8.6–10)
CHLORIDE SERPL-SCNC: 102 MMOL/L (ref 98–107)
CREAT SERPL-MCNC: 0.62 MG/DL (ref 0.51–0.95)
DEPRECATED HCO3 PLAS-SCNC: 25 MMOL/L (ref 22–29)
EGFRCR SERPLBLD CKD-EPI 2021: >90 ML/MIN/1.73M2
ERYTHROCYTE [DISTWIDTH] IN BLOOD BY AUTOMATED COUNT: 13.8 % (ref 10–15)
GLUCOSE SERPL-MCNC: 121 MG/DL (ref 70–99)
HCT VFR BLD AUTO: 49 % (ref 35–47)
HGB BLD-MCNC: 16.1 G/DL (ref 11.7–15.7)
LIPASE SERPL-CCNC: 15 U/L (ref 13–60)
MCH RBC QN AUTO: 28.9 PG (ref 26.5–33)
MCHC RBC AUTO-ENTMCNC: 32.9 G/DL (ref 31.5–36.5)
MCV RBC AUTO: 88 FL (ref 78–100)
NT-PROBNP SERPL-MCNC: <36 PG/ML (ref 0–450)
PLATELET # BLD AUTO: 282 10E3/UL (ref 150–450)
POTASSIUM SERPL-SCNC: 4.2 MMOL/L (ref 3.4–5.3)
PROT SERPL-MCNC: 7.5 G/DL (ref 6.4–8.3)
RBC # BLD AUTO: 5.58 10E6/UL (ref 3.8–5.2)
SODIUM SERPL-SCNC: 139 MMOL/L (ref 135–145)
TROPONIN T SERPL HS-MCNC: <6 NG/L
WBC # BLD AUTO: 8.9 10E3/UL (ref 4–11)

## 2023-12-05 PROCEDURE — 80053 COMPREHEN METABOLIC PANEL: CPT | Performed by: EMERGENCY MEDICINE

## 2023-12-05 PROCEDURE — 36415 COLL VENOUS BLD VENIPUNCTURE: CPT | Performed by: EMERGENCY MEDICINE

## 2023-12-05 PROCEDURE — 93005 ELECTROCARDIOGRAM TRACING: CPT | Performed by: STUDENT IN AN ORGANIZED HEALTH CARE EDUCATION/TRAINING PROGRAM

## 2023-12-05 PROCEDURE — 93005 ELECTROCARDIOGRAM TRACING: CPT | Performed by: EMERGENCY MEDICINE

## 2023-12-05 PROCEDURE — 83690 ASSAY OF LIPASE: CPT | Performed by: EMERGENCY MEDICINE

## 2023-12-05 PROCEDURE — 83880 ASSAY OF NATRIURETIC PEPTIDE: CPT | Performed by: EMERGENCY MEDICINE

## 2023-12-05 PROCEDURE — 84484 ASSAY OF TROPONIN QUANT: CPT | Performed by: EMERGENCY MEDICINE

## 2023-12-05 PROCEDURE — 85014 HEMATOCRIT: CPT | Performed by: EMERGENCY MEDICINE

## 2023-12-05 PROCEDURE — 99284 EMERGENCY DEPT VISIT MOD MDM: CPT

## 2023-12-05 NOTE — ED PROVIDER NOTES
Emergency Department Encounter     Evaluation Date & Time:   No admission date for patient encounter.    CHIEF COMPLAINT:  Abdominal Pain and Chest Pain      Triage Note:Patient has abdominal pain that has been constant for a while. Patient also states she has midsternal chest pain that at times makes her short of breath. Patient is also concerned about maybe being pregnant since her period was due on 2023 and she still has not started menstrating.     Triage Assessment (Adult)       Row Name 23 [id number]          Triage Assessment    Airway WDL WDL        Respiratory WDL    Respiratory WDL WDL        Skin Circulation/Temperature WDL    Skin Circulation/Temperature WDL WDL        Cardiac WDL    Cardiac WDL WDL        Peripheral/Neurovascular WDL    Peripheral Neurovascular WDL WDL        Cognitive/Neuro/Behavioral WDL    Cognitive/Neuro/Behavioral WDL WDL                     Impression and Plan       FINAL IMPRESSION:    ICD-10-CM    1. Chest pain, unspecified type  R07.9       2. Chronic abdominal pain  R10.9     G89.29             ED COURSE & MEDICAL DECISION MAKIN:43 PM I met the patient and performed my initial interview and exam.    33 year old female, history of asthma, obesity and tobacco abuse, who presents for evaluation of burning substernal chest pain that has been intermittent for the past few months associated with occasional shortness of breath.     She also reports cramping, generalized abdominal pain that has been ongoing for at least 13 years. No associated nausea or vomiting, but does have intermittent diarrhea. No urinary symptoms or fevers.     On exam, she has RRR with clear and symmetrical breath sounds.  Abdomen is soft with mild tenderness to palpation LUQ, epigastrium, RUQ and RLQ; no peritoneal signs.    EKG performed and demonstrated NSR with no ischemic changes.  Troponin negative (<6).    No clinical or laboratory (BNP <36) evidence of acute CHF.    Labs otherwise  remarkable for no leukocytosis (WBC 8.9) with mild polycythemia (Hb 16.1).  She has no significant electrolyte derangements or renal impairment.  Liver enzymes elevated (, ) with no laboratory evidence of biliary obstruction or pancreatitis.    I had planned to perform imaging studies, including CXR, CT abdomen / pelvis and possibly RUQ ultrasound, however the patient left prior to completion of her evaluation.  She did not discuss with me that she was leaving and therefore return precautions were not provided. Patient stable throughout ED course.      At the conclusion of the encounter I discussed the results of all the tests and the disposition. The questions were answered. The patient acknowledged understanding and was agreeable with the care plan.        Medical Decision Making    History:  Supplemental history from: Documented in chart, if applicable  External Record(s) reviewed: Inpatient Record: [facility] on 28-30 April 2023    Work Up:  Chart documentation includes differential considered and any EKGs or imaging independently interpreted by provider, where specified.  In additional to work up documented, I considered the following work up: Documented in chart, if applicable.    External consultation:  Discussion of management with another provider: Documented in chart, if applicable    Complicating factors:  Care impacted by chronic illness: Smoking / Nicotine Use and Other: asthma  Care affected by social determinants of health: Access to Medical Care    Disposition considerations:  Patient left prior to completion of evaluation; I was not aware that she was leaving    MEDICATIONS GIVEN IN THE EMERGENCY DEPARTMENT:  Medications - No data to display    NEW PRESCRIPTIONS STARTED AT TODAY'S ED VISIT:  Discharge Medication List as of 12/5/2023  5:06 PM          HPI     HPI     Amanda Whiting is a 33 year old female, history of asthma, obesity and tobacco abuse, who presents to this ED  "via walk-in for evaluation of chest pain and abdominal pain.    Patient reports chest pain that has been intermittent since onset a few months ago. The episodes of pain last for 10-30 minutes and occur 3-4 times daily. The pain is substernal in location without radiation into her back, arms, neck or jaw. The pain is burning in nature and worse when she smokes. She reports associated intermittent shortness of breath.     Patient also reports abdominal pain that has been ongoing for at least 13 years. The pain is generalized in location and cramping in nature. She denies provocative features. No associated nausea or vomiting, but does have intermittent diarrhea. No urinary symptoms or fevers.     She has no primary care provider or insurance and has had no referrals for her ongoing issues. Her last period was on October 31st (35 days ago), and it is regular, typically starting around the last day of each month.     Per chart review, the patient presented to  on 28-30 April 2023 for evaluation of asthma exacerbation. Patient required supplemental oxygen in the ER. Nebulizers and steroids were used to manage the asthma exacerbation. Chest x-ray was unremarkable with no signs of acute disease.     REVIEW OF SYSTEMS:  All other systems reviewed and are negative.      Medical History     Past Medical History:   Diagnosis Date    Uncomplicated asthma        History reviewed. No pertinent surgical history.    History reviewed. No pertinent family history.         albuterol (PROAIR HFA/PROVENTIL HFA/VENTOLIN HFA) 108 (90 Base) MCG/ACT inhaler  fluticasone (FLOVENT DISKUS) 50 MCG/ACT inhaler        Physical Exam     First Vitals:  BP (!) 170/102   Pulse 85   Temp 96.9  F (36.1  C) (Temporal)   Resp 18   Ht 1.702 m (5' 7\")   Wt 102.1 kg (225 lb)   LMP 10/31/2023 (Exact Date)   SpO2 95%   BMI 35.24 kg/m        PHYSICAL EXAM:   Physical Exam    GENERAL: Awake, alert.  In no acute distress.   HEENT: " Normocephalic, atraumatic.   NECK: No stridor.  PULMONARY: Symmetrical breath sounds without distress.  Lungs clear to auscultation bilaterally without wheezes, rhonchi or rales.  CARDIO: Regular rate and rhythm.  No significant murmur, rub or gallop.  Radial pulses strong and symmetrical.  ABDOMINAL: Abdomen soft, non-distended with mild tenderness to palpation LUQ, epigastrium, RUQ and RLQ; no rebound tenderness or guarding.   EXTREMITIES: No lower extremity swelling or edema.      NEURO: Alert and oriented to person, place and time.  Cranial nerves grossly intact.  No focal motor deficit.  Normal gait.   PSYCH: Normal mood and affect.    Results     LAB:  All pertinent labs reviewed and interpreted  Labs Ordered and Resulted from Time of ED Arrival to Time of ED Departure   CBC WITH PLATELETS - Abnormal       Result Value    WBC Count 8.9      RBC Count 5.58 (*)     Hemoglobin 16.1 (*)     Hematocrit 49.0 (*)     MCV 88      MCH 28.9      MCHC 32.9      RDW 13.8      Platelet Count 282         EC2023, 16:11; NSR with rate of 75 bpm; sinus arrhythmia; normal intervals; normal conduction; no ST-T wave changes consistent with ACS or pericarditis; compared to previous EKG dated 2023, there are no significant changes    EKG independently reviewed and interpreted by Enid Henning MD        ISandor, am serving as a scribe to document services personally performed by Enid Henning MD based on my observation and the provider's statements to me. IEnid MD attest that Sandor Avelar is acting in a scribe capacity, has observed my performance of the services and has documented them in accordance with my direction.    Enid Henning MD  Emergency Medicine  [facility] EMERGENCY DEPARTMENT           Enid Henning MD  [id number]

## 2023-12-05 NOTE — ED TRIAGE NOTES
Patient has abdominal pain that has been constant for a while. Patient also states she has midsternal chest pain that at times makes her short of breath. Patient is also concerned about maybe being pregnant since her period was due on 11/30/2023 and she still has not started menstrating.     Triage Assessment (Adult)       Row Name 12/05/23 1603          Triage Assessment    Airway WDL WDL        Respiratory WDL    Respiratory WDL WDL        Skin Circulation/Temperature WDL    Skin Circulation/Temperature WDL WDL        Cardiac WDL    Cardiac WDL WDL        Peripheral/Neurovascular WDL    Peripheral Neurovascular WDL WDL        Cognitive/Neuro/Behavioral WDL    Cognitive/Neuro/Behavioral WDL WDL

## 2023-12-10 LAB
ATRIAL RATE - MUSE: 75 BPM
DIASTOLIC BLOOD PRESSURE - MUSE: NORMAL MMHG
INTERPRETATION ECG - MUSE: NORMAL
P AXIS - MUSE: 54 DEGREES
PR INTERVAL - MUSE: 138 MS
QRS DURATION - MUSE: 72 MS
QT - MUSE: 370 MS
QTC - MUSE: 413 MS
R AXIS - MUSE: 68 DEGREES
SYSTOLIC BLOOD PRESSURE - MUSE: NORMAL MMHG
T AXIS - MUSE: 20 DEGREES
VENTRICULAR RATE- MUSE: 75 BPM

## 2024-11-06 LAB
ABO (EXTERNAL): NORMAL
HEMOGLOBIN (EXTERNAL): 13.3 G/DL (ref 11.7–15.5)
HEMOGLOBIN (EXTERNAL): 13.3 G/DL (ref 11.7–15.5)
HEPATITIS B SURFACE ANTIGEN (EXTERNAL): NONREACTIVE
HEPATITIS B SURFACE ANTIGEN (EXTERNAL): NONREACTIVE
HEPATITIS C ANTIBODY (EXTERNAL): NONREACTIVE
HEPATITIS C ANTIBODY (EXTERNAL): NONREACTIVE
HIV1+2 AB SERPL QL IA: NONREACTIVE
PLATELET COUNT (EXTERNAL): 296 10E3/UL (ref 140–400)
PLATELET COUNT (EXTERNAL): 296 10E3/UL (ref 140–400)
RH (EXTERNAL): POSITIVE
RUBELLA ANTIBODY IGG (EXTERNAL): NORMAL
RUBELLA ANTIBODY IGG (EXTERNAL): NORMAL
VDRL (SYPHILIS) (EXTERNAL): NEGATIVE
VDRL (SYPHILIS) (EXTERNAL): NONREACTIVE

## 2024-11-10 LAB — RUBELLA ANTIBODY IGG (EXTERNAL): REACTIVE

## 2025-02-13 ENCOUNTER — TRANSFERRED RECORDS (OUTPATIENT)
Dept: HEALTH INFORMATION MANAGEMENT | Facility: CLINIC | Age: 35
End: 2025-02-13

## 2025-04-16 ENCOUNTER — HOSPITAL ENCOUNTER (INPATIENT)
Facility: HOSPITAL | Age: 35
End: 2025-04-16
Attending: FAMILY MEDICINE | Admitting: FAMILY MEDICINE

## 2025-04-16 DIAGNOSIS — O09.33 SUPERVISION OF HIGH-RISK PREGNANCY WITH INSUFFICIENT PRENATAL CARE IN THIRD TRIMESTER: ICD-10-CM

## 2025-04-16 PROBLEM — Z34.90 PREGNANT: Status: ACTIVE | Noted: 2025-04-16

## 2025-04-16 LAB
ABO + RH BLD: NORMAL
ALBUMIN UR-MCNC: 30 MG/DL
APPEARANCE UR: ABNORMAL
BACTERIA #/AREA URNS HPF: ABNORMAL /HPF
BILIRUB UR QL STRIP: NEGATIVE
BLD GP AB SCN SERPL QL: NEGATIVE
COLOR UR AUTO: YELLOW
ERYTHROCYTE [DISTWIDTH] IN BLOOD BY AUTOMATED COUNT: 12.9 % (ref 10–15)
EST. AVERAGE GLUCOSE BLD GHB EST-MCNC: 97 MG/DL
GLUCOSE BLDC GLUCOMTR-MCNC: 110 MG/DL (ref 70–99)
GLUCOSE UR STRIP-MCNC: 30 MG/DL
HBA1C MFR BLD: 5 %
HCT VFR BLD AUTO: 32.1 % (ref 35–47)
HGB BLD-MCNC: 11.2 G/DL (ref 11.7–15.7)
HGB UR QL STRIP: NEGATIVE
HOLD SPECIMEN: NORMAL
HOLD SPECIMEN: NORMAL
KETONES UR STRIP-MCNC: ABNORMAL MG/DL
LEUKOCYTE ESTERASE UR QL STRIP: ABNORMAL
MCH RBC QN AUTO: 29.6 PG (ref 26.5–33)
MCHC RBC AUTO-ENTMCNC: 34.9 G/DL (ref 31.5–36.5)
MCV RBC AUTO: 85 FL (ref 78–100)
MUCOUS THREADS #/AREA URNS LPF: PRESENT /LPF
NITRATE UR QL: NEGATIVE
PH UR STRIP: 6 [PH] (ref 5–7)
PLATELET # BLD AUTO: 222 10E3/UL (ref 150–450)
RBC # BLD AUTO: 3.78 10E6/UL (ref 3.8–5.2)
RBC URINE: 1 /HPF
SP GR UR STRIP: 1.04 (ref 1–1.03)
SPECIMEN EXP DATE BLD: NORMAL
SQUAMOUS EPITHELIAL: 5 /HPF
UROBILINOGEN UR STRIP-MCNC: 2 MG/DL
WBC # BLD AUTO: 8.9 10E3/UL (ref 4–11)
WBC URINE: 10 /HPF

## 2025-04-16 PROCEDURE — 85014 HEMATOCRIT: CPT | Performed by: FAMILY MEDICINE

## 2025-04-16 PROCEDURE — 81003 URINALYSIS AUTO W/O SCOPE: CPT | Performed by: FAMILY MEDICINE

## 2025-04-16 PROCEDURE — 86900 BLOOD TYPING SEROLOGIC ABO: CPT | Performed by: FAMILY MEDICINE

## 2025-04-16 PROCEDURE — 87086 URINE CULTURE/COLONY COUNT: CPT | Performed by: FAMILY MEDICINE

## 2025-04-16 PROCEDURE — 86780 TREPONEMA PALLIDUM: CPT | Performed by: FAMILY MEDICINE

## 2025-04-16 PROCEDURE — 120N000001 HC R&B MED SURG/OB

## 2025-04-16 PROCEDURE — 36415 COLL VENOUS BLD VENIPUNCTURE: CPT | Performed by: FAMILY MEDICINE

## 2025-04-16 PROCEDURE — 81001 URINALYSIS AUTO W/SCOPE: CPT | Performed by: FAMILY MEDICINE

## 2025-04-16 PROCEDURE — 83036 HEMOGLOBIN GLYCOSYLATED A1C: CPT | Performed by: FAMILY MEDICINE

## 2025-04-16 RX ORDER — LOPERAMIDE HYDROCHLORIDE 2 MG/1
4 CAPSULE ORAL
Status: DISCONTINUED | OUTPATIENT
Start: 2025-04-16 | End: 2025-04-19 | Stop reason: HOSPADM

## 2025-04-16 RX ORDER — NICOTINE POLACRILEX 4 MG
15-30 LOZENGE BUCCAL
Status: DISCONTINUED | OUTPATIENT
Start: 2025-04-16 | End: 2025-04-19

## 2025-04-16 RX ORDER — LOPERAMIDE HYDROCHLORIDE 2 MG/1
2 CAPSULE ORAL
Status: DISCONTINUED | OUTPATIENT
Start: 2025-04-16 | End: 2025-04-19 | Stop reason: HOSPADM

## 2025-04-16 RX ORDER — PROCHLORPERAZINE MALEATE 10 MG
10 TABLET ORAL EVERY 6 HOURS PRN
Status: DISCONTINUED | OUTPATIENT
Start: 2025-04-16 | End: 2025-04-19 | Stop reason: HOSPADM

## 2025-04-16 RX ORDER — VITAMIN A, VITAMIN C, VITAMIN D-3, VITAMIN E, VITAMIN B-1, VITAMIN B-2, NIACIN, VITAMIN B-6, CALCIUM, IRON, ZINC, COPPER 4000; 120; 400; 22; 1.84; 3; 20; 10; 1; 12; 200; 27; 25; 2 [IU]/1; MG/1; [IU]/1; MG/1; MG/1; MG/1; MG/1; MG/1; MG/1; UG/1; MG/1; MG/1; MG/1; MG/1
1 TABLET ORAL DAILY
COMMUNITY

## 2025-04-16 RX ORDER — OXYTOCIN/0.9 % SODIUM CHLORIDE 30/500 ML
340 PLASTIC BAG, INJECTION (ML) INTRAVENOUS CONTINUOUS PRN
Status: DISCONTINUED | OUTPATIENT
Start: 2025-04-16 | End: 2025-04-19 | Stop reason: HOSPADM

## 2025-04-16 RX ORDER — OXYTOCIN 10 [USP'U]/ML
10 INJECTION, SOLUTION INTRAMUSCULAR; INTRAVENOUS
Status: DISCONTINUED | OUTPATIENT
Start: 2025-04-16 | End: 2025-04-19 | Stop reason: HOSPADM

## 2025-04-16 RX ORDER — IBUPROFEN 800 MG/1
800 TABLET, FILM COATED ORAL
Status: COMPLETED | OUTPATIENT
Start: 2025-04-16 | End: 2025-04-19

## 2025-04-16 RX ORDER — TRANEXAMIC ACID 10 MG/ML
1 INJECTION, SOLUTION INTRAVENOUS EVERY 30 MIN PRN
Status: DISCONTINUED | OUTPATIENT
Start: 2025-04-16 | End: 2025-04-19 | Stop reason: HOSPADM

## 2025-04-16 RX ORDER — NALOXONE HYDROCHLORIDE 0.4 MG/ML
0.4 INJECTION, SOLUTION INTRAMUSCULAR; INTRAVENOUS; SUBCUTANEOUS
Status: DISCONTINUED | OUTPATIENT
Start: 2025-04-16 | End: 2025-04-21 | Stop reason: HOSPADM

## 2025-04-16 RX ORDER — ONDANSETRON 2 MG/ML
4 INJECTION INTRAMUSCULAR; INTRAVENOUS EVERY 6 HOURS PRN
Status: DISCONTINUED | OUTPATIENT
Start: 2025-04-16 | End: 2025-04-19 | Stop reason: HOSPADM

## 2025-04-16 RX ORDER — NALOXONE HYDROCHLORIDE 0.4 MG/ML
0.2 INJECTION, SOLUTION INTRAMUSCULAR; INTRAVENOUS; SUBCUTANEOUS
Status: DISCONTINUED | OUTPATIENT
Start: 2025-04-16 | End: 2025-04-21 | Stop reason: HOSPADM

## 2025-04-16 RX ORDER — OXYTOCIN 10 [USP'U]/ML
10 INJECTION, SOLUTION INTRAMUSCULAR; INTRAVENOUS
Status: DISCONTINUED | OUTPATIENT
Start: 2025-04-16 | End: 2025-04-21 | Stop reason: HOSPADM

## 2025-04-16 RX ORDER — OXYTOCIN/0.9 % SODIUM CHLORIDE 30/500 ML
100-340 PLASTIC BAG, INJECTION (ML) INTRAVENOUS CONTINUOUS PRN
Status: DISCONTINUED | OUTPATIENT
Start: 2025-04-16 | End: 2025-04-21 | Stop reason: HOSPADM

## 2025-04-16 RX ORDER — KETOROLAC TROMETHAMINE 15 MG/ML
15 INJECTION, SOLUTION INTRAMUSCULAR; INTRAVENOUS
Status: COMPLETED | OUTPATIENT
Start: 2025-04-16 | End: 2025-04-19

## 2025-04-16 RX ORDER — MISOPROSTOL 200 UG/1
400 TABLET ORAL
Status: DISCONTINUED | OUTPATIENT
Start: 2025-04-16 | End: 2025-04-19 | Stop reason: HOSPADM

## 2025-04-16 RX ORDER — METHYLERGONOVINE MALEATE 0.2 MG/ML
200 INJECTION INTRAVENOUS
Status: DISCONTINUED | OUTPATIENT
Start: 2025-04-16 | End: 2025-04-19 | Stop reason: HOSPADM

## 2025-04-16 RX ORDER — SODIUM CHLORIDE, SODIUM LACTATE, POTASSIUM CHLORIDE, CALCIUM CHLORIDE 600; 310; 30; 20 MG/100ML; MG/100ML; MG/100ML; MG/100ML
INJECTION, SOLUTION INTRAVENOUS CONTINUOUS
Status: DISCONTINUED | OUTPATIENT
Start: 2025-04-16 | End: 2025-04-19 | Stop reason: HOSPADM

## 2025-04-16 RX ORDER — METOCLOPRAMIDE 10 MG/1
10 TABLET ORAL EVERY 6 HOURS PRN
Status: DISCONTINUED | OUTPATIENT
Start: 2025-04-16 | End: 2025-04-18

## 2025-04-16 RX ORDER — CITRIC ACID/SODIUM CITRATE 334-500MG
30 SOLUTION, ORAL ORAL
Status: DISCONTINUED | OUTPATIENT
Start: 2025-04-16 | End: 2025-04-19 | Stop reason: HOSPADM

## 2025-04-16 RX ORDER — ACETAMINOPHEN 325 MG/1
650 TABLET ORAL EVERY 4 HOURS PRN
Status: DISCONTINUED | OUTPATIENT
Start: 2025-04-16 | End: 2025-04-19 | Stop reason: HOSPADM

## 2025-04-16 RX ORDER — DEXTROSE MONOHYDRATE 25 G/50ML
25-50 INJECTION, SOLUTION INTRAVENOUS
Status: DISCONTINUED | OUTPATIENT
Start: 2025-04-16 | End: 2025-04-19

## 2025-04-16 RX ORDER — TERBUTALINE SULFATE 1 MG/ML
0.25 INJECTION SUBCUTANEOUS
Status: DISCONTINUED | OUTPATIENT
Start: 2025-04-16 | End: 2025-04-19 | Stop reason: HOSPADM

## 2025-04-16 RX ORDER — LACTOBACILLUS RHAMNOSUS GG 10B CELL
1 CAPSULE ORAL 2 TIMES DAILY
COMMUNITY

## 2025-04-16 RX ORDER — ONDANSETRON 4 MG/1
4 TABLET, ORALLY DISINTEGRATING ORAL EVERY 6 HOURS PRN
Status: DISCONTINUED | OUTPATIENT
Start: 2025-04-16 | End: 2025-04-19 | Stop reason: HOSPADM

## 2025-04-16 RX ORDER — METOCLOPRAMIDE HYDROCHLORIDE 5 MG/ML
10 INJECTION INTRAMUSCULAR; INTRAVENOUS EVERY 6 HOURS PRN
Status: DISCONTINUED | OUTPATIENT
Start: 2025-04-16 | End: 2025-04-19 | Stop reason: HOSPADM

## 2025-04-16 RX ORDER — MISOPROSTOL 200 UG/1
800 TABLET ORAL
Status: DISCONTINUED | OUTPATIENT
Start: 2025-04-16 | End: 2025-04-19 | Stop reason: HOSPADM

## 2025-04-16 RX ORDER — ASPIRIN 81 MG/1
81 TABLET ORAL DAILY
COMMUNITY

## 2025-04-16 RX ORDER — FENTANYL CITRATE 50 UG/ML
100 INJECTION, SOLUTION INTRAMUSCULAR; INTRAVENOUS
Status: DISCONTINUED | OUTPATIENT
Start: 2025-04-16 | End: 2025-04-19 | Stop reason: HOSPADM

## 2025-04-16 RX ORDER — CARBOPROST TROMETHAMINE 250 UG/ML
250 INJECTION, SOLUTION INTRAMUSCULAR
Status: DISCONTINUED | OUTPATIENT
Start: 2025-04-16 | End: 2025-04-19 | Stop reason: HOSPADM

## 2025-04-16 ASSESSMENT — ACTIVITIES OF DAILY LIVING (ADL)
ADLS_ACUITY_SCORE: 31
ADLS_ACUITY_SCORE: 31
CONCENTRATING,_REMEMBERING_OR_MAKING_DECISIONS_DIFFICULTY: NO
ADLS_ACUITY_SCORE: 26
DOING_ERRANDS_INDEPENDENTLY_DIFFICULTY: NO

## 2025-04-17 VITALS
WEIGHT: 209 LBS | SYSTOLIC BLOOD PRESSURE: 114 MMHG | RESPIRATION RATE: 18 BRPM | BODY MASS INDEX: 32.8 KG/M2 | HEIGHT: 67 IN | HEART RATE: 86 BPM | DIASTOLIC BLOOD PRESSURE: 72 MMHG | TEMPERATURE: 97.8 F | OXYGEN SATURATION: 96 %

## 2025-04-17 PROBLEM — O09.33 SUPERVISION OF HIGH-RISK PREGNANCY WITH INSUFFICIENT PRENATAL CARE IN THIRD TRIMESTER: Status: ACTIVE | Noted: 2017-03-30

## 2025-04-17 PROBLEM — O24.410 DIET CONTROLLED GESTATIONAL DIABETES MELLITUS (GDM) IN THIRD TRIMESTER: Status: ACTIVE | Noted: 2025-04-17

## 2025-04-17 LAB
AMPHETAMINES UR QL SCN: NORMAL
BARBITURATES UR QL SCN: NORMAL
BENZODIAZ UR QL SCN: NORMAL
BZE UR QL SCN: NORMAL
CANNABINOIDS UR QL SCN: NORMAL
FENTANYL UR QL: NORMAL
GLUCOSE BLDC GLUCOMTR-MCNC: 129 MG/DL (ref 70–99)
GLUCOSE BLDC GLUCOMTR-MCNC: 138 MG/DL (ref 70–99)
GLUCOSE BLDC GLUCOMTR-MCNC: 145 MG/DL (ref 70–99)
GLUCOSE BLDC GLUCOMTR-MCNC: 95 MG/DL (ref 70–99)
GROUP B STREPTOCOCCUS (EXTERNAL): POSITIVE
OPIATES UR QL SCN: NORMAL
PCP QUAL URINE (ROCHE): NORMAL
T PALLIDUM AB SER QL: NONREACTIVE

## 2025-04-17 PROCEDURE — 80307 DRUG TEST PRSMV CHEM ANLYZR: CPT | Performed by: FAMILY MEDICINE

## 2025-04-17 PROCEDURE — 250N000013 HC RX MED GY IP 250 OP 250 PS 637: Performed by: FAMILY MEDICINE

## 2025-04-17 PROCEDURE — 999N000128 HC STATISTIC PERIPHERAL IV START W/O US GUIDANCE

## 2025-04-17 PROCEDURE — 120N000001 HC R&B MED SURG/OB

## 2025-04-17 RX ORDER — HYDROXYZINE HYDROCHLORIDE 50 MG/1
50 TABLET, FILM COATED ORAL
Status: DISCONTINUED | OUTPATIENT
Start: 2025-04-17 | End: 2025-04-19 | Stop reason: HOSPADM

## 2025-04-17 RX ORDER — MORPHINE SULFATE 10 MG/ML
15 INJECTION, SOLUTION INTRAMUSCULAR; INTRAVENOUS
Status: DISCONTINUED | OUTPATIENT
Start: 2025-04-17 | End: 2025-04-19 | Stop reason: HOSPADM

## 2025-04-17 RX ORDER — MISOPROSTOL 100 UG/1
25 TABLET ORAL
Status: COMPLETED | OUTPATIENT
Start: 2025-04-17 | End: 2025-04-18

## 2025-04-17 RX ORDER — CALCIUM CARBONATE 500 MG/1
500 TABLET, CHEWABLE ORAL 2 TIMES DAILY PRN
Status: DISCONTINUED | OUTPATIENT
Start: 2025-04-17 | End: 2025-04-21 | Stop reason: HOSPADM

## 2025-04-17 RX ADMIN — MISOPROSTOL 25 MCG: 100 TABLET ORAL at 21:44

## 2025-04-17 RX ADMIN — MISOPROSTOL 25 MCG: 100 TABLET ORAL at 11:32

## 2025-04-17 RX ADMIN — MISOPROSTOL 25 MCG: 100 TABLET ORAL at 23:53

## 2025-04-17 RX ADMIN — MISOPROSTOL 25 MCG: 100 TABLET ORAL at 17:46

## 2025-04-17 RX ADMIN — MISOPROSTOL 25 MCG: 100 TABLET ORAL at 15:44

## 2025-04-17 RX ADMIN — MISOPROSTOL 25 MCG: 100 TABLET ORAL at 19:44

## 2025-04-17 RX ADMIN — MISOPROSTOL 25 MCG: 100 TABLET ORAL at 13:31

## 2025-04-17 RX ADMIN — MISOPROSTOL 25 MCG: 100 TABLET ORAL at 09:29

## 2025-04-17 ASSESSMENT — ACTIVITIES OF DAILY LIVING (ADL)
ADLS_ACUITY_SCORE: 26

## 2025-04-17 NOTE — PROGRESS NOTES
Amanda is resting, feeling occasional contractions and dull back pain. Category 1 tracing and active fetal movement. Vitals stable. PO cytotec given per orders to promote cervical ripening. 2 PIVs saline locked.

## 2025-04-17 NOTE — PROGRESS NOTES
Asked by bedside nurse to assess fetal position using US. Fetus is confirmed to be in the vertex position.     Monica Matute MD

## 2025-04-17 NOTE — PROGRESS NOTES
Amanda has been resting throughout the night. Plan to start cytotec this morning with approval from OB chair since she is 37 weeks today.

## 2025-04-17 NOTE — H&P
Maternal Admission H&P  [facility]  Date of Admission: 2025  Date of Service: 2025    Name      Amanda Whiting         1990  MRN       [id number]  PCP        Estuardo Flores         Assessment and Plan  35 year old  at 37w0d with insufficient prenatal care.  -Gestational diabetes, unknown control  Anticipate .  Analgesia per patient's wishes  Group B strep: positive  Transportation and childcare needs are a problem      Chief Complaint  Here for induction    HPI  Amanda Whiting is a 35 year old woman at 37w0d, based on an Estimated Date of Delivery: May 8, 2025. She presents with membranes intact and here for a non-reactive NST, poorly (or just unknown) controlled GDM, significant social needs with difficulty finding  and transportation problems as well as insufficient prenatal care.     Amanda was seen in clinic for a NST which had some pseudo sinusoidal patter in the context of moderate variability and a BPP 8/8. Her spot glucose earlier was 185. She had a reassuring strip last night but with all the RF we are admitting for ripening/induction for poorly controlled diabetes and significant social impairment.     Patient Active Problem List    Diagnosis Date Noted    Diet controlled gestational diabetes mellitus (GDM) in third trimester 2025     Priority: Medium    Pregnant 2025     Priority: Medium    Acute respiratory failure with hypoxia (H) 2023     Priority: Medium    Exacerbation of asthma, unspecified asthma severity, unspecified whether persistent 2023     Priority: Medium    Supervision of high-risk pregnancy with insufficient prenatal care in third trimester 2017     Priority: Medium    Normal vaginal delivery 2010     Priority: Medium      OB History    Para Term  AB Living   4 3 3 0 0 3   SAB IAB Ectopic Multiple Live Births   0 0 0 0 3      # Outcome Date GA Lbr Yadiel/2nd  Weight Sex Type Anes PTL Lv   4 Current            3 Term 06/13/17   3.175 kg (7 lb) M Vag-Spont  N JUDITH   2 Term 07/22/12   2.722 kg (6 lb) M Vag-Spont  N JUDITH   1 Term 11/05/10   2.722 kg (6 lb) M Vag-Spont   JUDITH       Review of Systems   Negative except what is noted in HPI.     Past Medical History  Past Medical History:   Diagnosis Date    Uncomplicated asthma         Past Surgical History  History reviewed. No pertinent surgical history.    Allergies  Bee pollens [bee pollen], Bee venom, and Penicillins    Family History  History reviewed. No pertinent family history.    Social History  I have reviewed this patient's social history and updated it with pertinent information if needed. Amanda Whiting  reports that she has quit smoking. Her smoking use included cigarettes. She does not have any smokeless tobacco history on file. She reports that she does not currently use alcohol.    Prior to Admission Medication List  Medications Prior to Admission   Medication Sig Dispense Refill Last Dose/Taking    aspirin 81 MG EC tablet Take 81 mg by mouth daily.   4/15/2025    lactobacillus rhamnosus, GG, (CULTURELL) capsule Take 1 capsule by mouth 2 times daily.   4/15/2025    Prenatal Vit-Fe Fumarate-FA (PRENATAL MULTIVITAMIN  PLUS IRON) 27-1 MG TABS Take 1 tablet by mouth daily.   4/15/2025    albuterol (PROAIR HFA/PROVENTIL HFA/VENTOLIN HFA) 108 (90 Base) MCG/ACT inhaler Inhale 2 puffs into the lungs every 6 hours as needed for shortness of breath, wheezing or cough 18 g 1     fluticasone (FLOVENT DISKUS) 50 MCG/ACT inhaler Inhale 2 puffs into the lungs every 12 hours 60 each 1         Allergies  Allergies   Allergen Reactions    Bee Pollens [Bee Pollen] Unknown    Bee Venom      BREATHING DIFFICULTY    Penicillins Unknown       There is no immunization history on file for this patient.     Physical Exam  /75 (BP Location: Left arm, Patient Position: Semi-Nickerson's, Cuff Size: Adult Regular)   Pulse 83   Temp  "98.2  F (36.8  C) (Oral)   Resp 14   HEART: RRR, no murmur  LUNGS: CTA bilaterally  Fetal Heart Tones: 130 baseline, moderate variablility, + accels, no decels, and Category I  CONTRACTIONS:  inconsistent  CERVIX: 0.5/ 50%/ vtx/ -3 (nurse exam last night)  FLUID: None noted.  Fetal Presentation: vertex.  Bishops 4-5    Labs  No results found for: \"GBPCRT\"   Antibody Screen   Date Value Ref Range Status   04/16/2025 Negative Negative Final     Hemoglobin   Date Value Ref Range Status   04/16/2025 11.2 (L) 11.7 - 15.7 g/dL Final      Admission on 04/16/2025   Component Date Value Ref Range Status    Estimated Average Glucose 04/16/2025 97  <117 mg/dL Final    Hemoglobin A1C 04/16/2025 5.0  <5.7 % Final    Normal <5.7%   Prediabetes 5.7-6.4%    Diabetes 6.5% or higher     Note: Adopted from ADA consensus guidelines.    Color Urine 04/16/2025 Yellow  Colorless, Straw, Light Yellow, Yellow Final    Appearance Urine 04/16/2025 Turbid (A)  Clear Final    Glucose Urine 04/16/2025 30 (A)  Negative mg/dL Final    Bilirubin Urine 04/16/2025 Negative  Negative Final    Ketones Urine 04/16/2025 Trace (A)  Negative mg/dL Final    Specific Gravity Urine 04/16/2025 1.037 (H)  1.001 - 1.030 Final    Blood Urine 04/16/2025 Negative  Negative Final    pH Urine 04/16/2025 6.0  5.0 - 7.0 Final    Protein Albumin Urine 04/16/2025 30 (A)  Negative mg/dL Final    Urobilinogen Urine 04/16/2025 2.0 (A)  Normal mg/dL Final    Nitrite Urine 04/16/2025 Negative  Negative Final    Leukocyte Esterase Urine 04/16/2025 500 A[place]into/uL (A)  Negative Final    Bacteria Urine 04/16/2025 Few (A)  None Seen /HPF Final    Mucus Urine 04/16/2025 Present (A)  None Seen /LPF Final    RBC Urine 04/16/2025 1  <=2 /HPF Final    WBC Urine 04/16/2025 10 (H)  <=5 /HPF Final    Squamous Epithelials Urine 04/16/2025 5 (H)  <=1 /HPF Final    WBC Count 04/16/2025 8.9  4.0 - 11.0 10e3/uL Final    RBC Count 04/16/2025 3.78 (L)  3.80 - 5.20 10e6/uL Final    Hemoglobin " 04/16/2025 11.2 (L)  11.7 - 15.7 g/dL Final    Hematocrit 04/16/2025 32.1 (L)  35.0 - 47.0 % Final    MCV 04/16/2025 85  78 - 100 fL Final    MCH 04/16/2025 29.6  26.5 - 33.0 pg Final    MCHC 04/16/2025 34.9  31.5 - 36.5 g/dL Final    RDW 04/16/2025 12.9  10.0 - 15.0 % Final    Platelet Count 04/16/2025 222  150 - 450 10e3/uL Final    Treponema Antibody Total 04/16/2025 Nonreactive  Nonreactive Final    ABO/RH(D) 04/16/2025 A POS   Final    Antibody Screen 04/16/2025 Negative  Negative Final    SPECIMEN EXPIRATION DATE 04/16/2025 [id number]   Final    Hold Specimen 04/16/2025 [facility]   Final    Hold Specimen 04/16/2025 [facility]   Final    GLUCOSE BY METER POCT 04/16/2025 110 (H)  70 - 99 mg/dL Final    VDRL (Syphilis) (External) 11/06/2024 Nonreactive  Nonreactive Final    Platelet Count (External) 11/06/2024 296  140 - 400 10e3/uL Final    Hepatitis B Surface Antigen (Exter* 11/06/2024 Nonreactive  Nonreactive Final    Rubella Antibody IgG (External) 11/06/2024 Immune  Nonreactive Final    Hepatitis C Antibody (External) 11/06/2024 Nonreactive  Nonreactive Final    Hemoglobin (External) 11/06/2024 13.3  11.7 - 15.5 g/dL Final    HIV 1&2 Antibody (External) 11/06/2024 Nonreactive  Nonreactive Final    ABO (External) 11/06/2024 A   Final    no antibodies    Rh (External) 11/06/2024 POSITIVE   Final    VDRL (Syphilis) (External) 11/06/2024 Negative  Nonreactive Final    Platelet Count (External) 11/06/2024 296  140 - 400 10e3/uL Final    Hepatitis B Surface Antigen (Exter* 11/06/2024 Nonreactive  Nonreactive Final    Rubella Antibody IgG (External) 11/06/2024 Immune  Nonreactive Final    Hepatitis C Antibody (External) 11/06/2024 Nonreactive  Nonreactive Final    Hemoglobin (External) 11/06/2024 13.3  11.7 - 15.5 g/dL Final    GLUCOSE BY METER POCT 04/17/2025 95  70 - 99 mg/dL Final        Completed by:   Jules Merino MD, MD  [facility]  4/17/2025 9:28 AM

## 2025-04-17 NOTE — PROGRESS NOTES
Dr. Merino called and updated regarding need to get approval for induction,  He will plan on calling Dr. Lechuga this morning.

## 2025-04-17 NOTE — PROGRESS NOTES
"Data: Patient presented to [facility]: 2025  8:47 PM.  Reason for maternal/fetal assessment is uterine contractions. Patient reports uterine contractions that started on Saturday, \"went away for a few days,\" and came back stronger today. Reports feeling uncomfortable uterine tightening and lower abdominal pressure. Patient denies leaking of vaginal fluid/rupture of membranes, vaginal bleeding, pelvic pressure, nausea, vomiting, headache, visual disturbances, epigastric or RUQ pain, significant edema. Patient reports fetal movement is normal. Patient is a 36w6d .  Prenatal record reviewed. Pregnancy has been complicated by diabetes.    Vital signs wnl. Support person is not present.     Action: Verbal consent for EFM. Triage assessment completed.     Response: Patient verbalized agreement with plan. Will contact Dr. Merino with update and further orders.        "

## 2025-04-17 NOTE — PROGRESS NOTES
Dr. Merino called the unit requesting an update on Amanda's status this evening. Reviewed contraction frequency, number of PO cytotec doses given, and fetal heart tracing. Vitals stable. Reviewed postprandial glucoses 145 and 129, reviewed negative utox. Good PO fluid intake. Ambulation encouraged. Plan to encourage sleep meds overnight per MD to promote rest.

## 2025-04-17 NOTE — PROGRESS NOTES
Patient admitted to Room 4, plan to start cytotec induction as soon as able, deferred d/t unit needs. FHR tracing with moderate variability, 15x15 accels, one variable noted at 2138, baseline 135 bpm. Denies vaginal bleeding and loss of fluid. Contractions irregular and palpate mild, abdomen soft in between. Patient reports feeling uncomfortable uterine tightening, describes some right upper quadrant pain, feels as though baby is kicking her in her ribs. Reports intermittently seeing some white spots today, denies headache. /87.

## 2025-04-17 NOTE — PROGRESS NOTES
This RN received a call from Dr. Merino notifying the unit that induction of labor has been approved by Dr. Lechuga. Plan to start oral misoprostol per orders.

## 2025-04-17 NOTE — PLAN OF CARE
Goal Outcome Evaluation:       Rested well overnight.  Awaiting approval for induction of labor.

## 2025-04-17 NOTE — PROGRESS NOTES
Infection Prevention Progress Note  4/17/2025      Patient Name: Amanda Whiting [id number]  Admit Date: 4/16/2025      MDRO Discontinuation  Infection Prevention has reviewed this patient's chart per the MDRO D/C Policy and have taken the following action:    Patient meets all the criteria for discontinuation and Infection Prevention will resolve the MRSA infection status.    If you have any questions, please contact Infection Prevention.    Madisyn Verdugo, Infection Prevention

## 2025-04-18 LAB
BACTERIA UR CULT: NO GROWTH
ERYTHROCYTE [DISTWIDTH] IN BLOOD BY AUTOMATED COUNT: 12.8 % (ref 10–15)
GLUCOSE BLDC GLUCOMTR-MCNC: 113 MG/DL (ref 70–99)
GLUCOSE BLDC GLUCOMTR-MCNC: 118 MG/DL (ref 70–99)
GLUCOSE BLDC GLUCOMTR-MCNC: 75 MG/DL (ref 70–99)
HCT VFR BLD AUTO: 33.2 % (ref 35–47)
HGB BLD-MCNC: 11.1 G/DL (ref 11.7–15.7)
MCH RBC QN AUTO: 28.7 PG (ref 26.5–33)
MCHC RBC AUTO-ENTMCNC: 33.4 G/DL (ref 31.5–36.5)
MCV RBC AUTO: 86 FL (ref 78–100)
PLATELET # BLD AUTO: 216 10E3/UL (ref 150–450)
RBC # BLD AUTO: 3.87 10E6/UL (ref 3.8–5.2)
WBC # BLD AUTO: 9.4 10E3/UL (ref 4–11)

## 2025-04-18 PROCEDURE — 120N000001 HC R&B MED SURG/OB

## 2025-04-18 PROCEDURE — 85014 HEMATOCRIT: CPT | Performed by: FAMILY MEDICINE

## 2025-04-18 PROCEDURE — 36415 COLL VENOUS BLD VENIPUNCTURE: CPT | Performed by: FAMILY MEDICINE

## 2025-04-18 PROCEDURE — 250N000013 HC RX MED GY IP 250 OP 250 PS 637: Performed by: FAMILY MEDICINE

## 2025-04-18 RX ORDER — LOPERAMIDE HYDROCHLORIDE 2 MG/1
4 CAPSULE ORAL
Status: DISCONTINUED | OUTPATIENT
Start: 2025-04-18 | End: 2025-04-19 | Stop reason: HOSPADM

## 2025-04-18 RX ORDER — TERBUTALINE SULFATE 1 MG/ML
0.25 INJECTION SUBCUTANEOUS
Status: DISCONTINUED | OUTPATIENT
Start: 2025-04-18 | End: 2025-04-19 | Stop reason: HOSPADM

## 2025-04-18 RX ORDER — IBUPROFEN 800 MG/1
800 TABLET, FILM COATED ORAL
Status: DISCONTINUED | OUTPATIENT
Start: 2025-04-18 | End: 2025-04-21 | Stop reason: HOSPADM

## 2025-04-18 RX ORDER — OXYTOCIN/0.9 % SODIUM CHLORIDE 30/500 ML
100-340 PLASTIC BAG, INJECTION (ML) INTRAVENOUS CONTINUOUS PRN
Status: DISCONTINUED | OUTPATIENT
Start: 2025-04-18 | End: 2025-04-21 | Stop reason: HOSPADM

## 2025-04-18 RX ORDER — MISOPROSTOL 200 UG/1
800 TABLET ORAL
Status: DISCONTINUED | OUTPATIENT
Start: 2025-04-18 | End: 2025-04-19 | Stop reason: HOSPADM

## 2025-04-18 RX ORDER — CITRIC ACID/SODIUM CITRATE 334-500MG
30 SOLUTION, ORAL ORAL
Status: DISCONTINUED | OUTPATIENT
Start: 2025-04-18 | End: 2025-04-19 | Stop reason: HOSPADM

## 2025-04-18 RX ORDER — FENTANYL CITRATE 50 UG/ML
50 INJECTION, SOLUTION INTRAMUSCULAR; INTRAVENOUS EVERY 30 MIN PRN
Status: DISCONTINUED | OUTPATIENT
Start: 2025-04-18 | End: 2025-04-19 | Stop reason: HOSPADM

## 2025-04-18 RX ORDER — KETOROLAC TROMETHAMINE 15 MG/ML
15 INJECTION, SOLUTION INTRAMUSCULAR; INTRAVENOUS
Status: DISCONTINUED | OUTPATIENT
Start: 2025-04-18 | End: 2025-04-21 | Stop reason: HOSPADM

## 2025-04-18 RX ORDER — CARBOPROST TROMETHAMINE 250 UG/ML
250 INJECTION, SOLUTION INTRAMUSCULAR
Status: DISCONTINUED | OUTPATIENT
Start: 2025-04-18 | End: 2025-04-19 | Stop reason: HOSPADM

## 2025-04-18 RX ORDER — LOPERAMIDE HYDROCHLORIDE 2 MG/1
2 CAPSULE ORAL
Status: DISCONTINUED | OUTPATIENT
Start: 2025-04-18 | End: 2025-04-19 | Stop reason: HOSPADM

## 2025-04-18 RX ORDER — MISOPROSTOL 100 UG/1
25 TABLET ORAL
Status: COMPLETED | OUTPATIENT
Start: 2025-04-18 | End: 2025-04-18

## 2025-04-18 RX ORDER — CEFAZOLIN SODIUM/WATER 2 G/20 ML
2 SYRINGE (ML) INTRAVENOUS ONCE
Status: DISCONTINUED | OUTPATIENT
Start: 2025-04-18 | End: 2025-04-19 | Stop reason: HOSPADM

## 2025-04-18 RX ORDER — TRANEXAMIC ACID 10 MG/ML
1 INJECTION, SOLUTION INTRAVENOUS EVERY 30 MIN PRN
Status: DISCONTINUED | OUTPATIENT
Start: 2025-04-18 | End: 2025-04-19 | Stop reason: HOSPADM

## 2025-04-18 RX ORDER — METHYLERGONOVINE MALEATE 0.2 MG/ML
200 INJECTION INTRAVENOUS
Status: DISCONTINUED | OUTPATIENT
Start: 2025-04-18 | End: 2025-04-19 | Stop reason: HOSPADM

## 2025-04-18 RX ORDER — OXYTOCIN/0.9 % SODIUM CHLORIDE 30/500 ML
340 PLASTIC BAG, INJECTION (ML) INTRAVENOUS CONTINUOUS PRN
Status: DISCONTINUED | OUTPATIENT
Start: 2025-04-18 | End: 2025-04-19 | Stop reason: HOSPADM

## 2025-04-18 RX ORDER — OXYTOCIN 10 [USP'U]/ML
10 INJECTION, SOLUTION INTRAMUSCULAR; INTRAVENOUS
Status: DISCONTINUED | OUTPATIENT
Start: 2025-04-18 | End: 2025-04-21 | Stop reason: HOSPADM

## 2025-04-18 RX ORDER — MISOPROSTOL 200 UG/1
400 TABLET ORAL
Status: DISCONTINUED | OUTPATIENT
Start: 2025-04-18 | End: 2025-04-19 | Stop reason: HOSPADM

## 2025-04-18 RX ORDER — OXYTOCIN 10 [USP'U]/ML
10 INJECTION, SOLUTION INTRAMUSCULAR; INTRAVENOUS
Status: DISCONTINUED | OUTPATIENT
Start: 2025-04-18 | End: 2025-04-19 | Stop reason: HOSPADM

## 2025-04-18 RX ORDER — CEFAZOLIN SODIUM 1 G/3ML
1 INJECTION, POWDER, FOR SOLUTION INTRAMUSCULAR; INTRAVENOUS EVERY 8 HOURS
Status: DISCONTINUED | OUTPATIENT
Start: 2025-04-18 | End: 2025-04-19 | Stop reason: HOSPADM

## 2025-04-18 RX ADMIN — MISOPROSTOL 25 MCG: 100 TABLET ORAL at 08:21

## 2025-04-18 RX ADMIN — MISOPROSTOL 25 MCG: 100 TABLET ORAL at 06:20

## 2025-04-18 RX ADMIN — MISOPROSTOL 25 MCG: 100 TABLET ORAL at 04:16

## 2025-04-18 RX ADMIN — DINOPROSTONE 10 MG: 10 INSERT VAGINAL at 11:27

## 2025-04-18 RX ADMIN — MISOPROSTOL 25 MCG: 100 TABLET ORAL at 01:49

## 2025-04-18 ASSESSMENT — ACTIVITIES OF DAILY LIVING (ADL)
ADLS_ACUITY_SCORE: 26

## 2025-04-18 NOTE — PROGRESS NOTES
Dr. Merino updated on patient status. Contraction irregular, mild by palpation. Patient resting throughout day. Patient quiet when RN at beside. Blood sugars stable. VSS

## 2025-04-18 NOTE — PROGRESS NOTES
"LABOR PROGRESS NOTE  2025 8:21 AM    SUBJECTIVE:  Amanda has had 11 doses of misoprostol. She has had some contractions but no increase in intensity. She denies HA or blurred vision. Bps and sugars have been well controlled.    OBJECTIVE:  /53   Pulse 86   Temp 98.5  F (36.9  C) (Oral)   Resp 16   Ht 1.702 m (5' 7\")   Wt 94.8 kg (209 lb)   SpO2 96%   BMI 32.73 kg/m    FHR: 140s Category I - baseline FHR of 110 to 160 bpm, moderate FHR variability, and Absence of late or variable FHR decelerations  Uterine Contractions:  irregular, every 2-7 minutes.  Cervix: dilation 2.5 cm , 50% effaced, soft, and -3 station.  Fluid: None noted.  Fetal Presentation: vertex.    ASSESSMENT & PLAN:   35 year old  at 37w1d here for cervical ripening and induction for poorly controlled GDM, social situation problems with transportation and  and insufficient prenatal care.  -diet controlled GDM has been in line here  -some cervical change with 11 doses of misoprostol  After last dose of misoprostol we will give her a break and then change to cervidil. Tonight, if cervidil does not make a major change we will use low dose ptocin for ripening after the patient has some time for sleep.    Completed by:   Jules Merino MD.  [facility]  2025 8:21 AM  "

## 2025-04-18 NOTE — DISCHARGE INSTRUCTIONS
Warning Signs after Having a Baby    Keep this paper on your fridge or somewhere else where you can see it.    Call your provider if you have any of these symptoms up to 12 weeks after having your baby.    Thoughts of hurting yourself or your baby  Pain in your chest or trouble breathing  Severe headache not helped by pain medicine  Eyesight concerns (blurry vision, seeing spots or flashes of light, other changes to eyesight)  Fainting, shaking or other signs of a seizure    Call 9-1-1 if you feel that it is an emergency.     The symptoms below can happen to anyone after giving birth. They can be very serious. Call your provider if you have any of these warning signs.    My provider s phone number: _______________________    Losing too much blood (hemorrhage)    Call your provider if you soak through a pad in less than an hour or pass blood clots bigger than a golf ball. These may be signs that you are bleeding too much.    Blood clots in the legs or lungs    After you give birth, your body naturally clots its blood to help prevent blood loss. Sometimes this increased clotting can happen in other areas of the body, like the legs or lungs. This can block your blood flow and be very dangerous.     Call your provider if you:  Have a red, swollen spot on the back of your leg that is warm or painful when you touch it.   Are coughing up blood.     Infection    Call your provider if you have any of these symptoms:  Fever of 100.4 F (38 C) or higher.  Pain or redness around your stitches if you had an incision.   Any yellow, white, or green fluid coming from places where you had stitches or surgery.    Mood Problems (postpartum depression)    Many people feel sad or have mood changes after having a baby. But for some people, these mood swings are worse.     Call your provider right away if you feel so anxious or nervous that you can't care for yourself or your baby.    Preeclampsia (high blood pressure)    Even if you  didn't have high blood pressure when you were pregnant, you are at risk for the high blood pressure disease called preeclampsia. This risk can last up to 12 weeks after giving birth.     Call your provider if you have:   Pain on your right side under your rib cage  Sudden swelling in the hands and face    Remember: You know your body. If something doesn't feel right, get medical help.     For informational purposes only. Not to replace the advice of your health care provider. Copyright 2020 [facility]. All rights reserved. Clinically reviewed by Melissa Diaz, RNC-OB, MSN. "[employer] 752378 - Rev 02/23.
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
## 2025-04-18 NOTE — PROGRESS NOTES
Discussed plan of care with patient and Dr. Merino. Plan to do cervidil 2 hours after last dose of Cytotec. Orders in for GBS+ (result sent to MD from clinic 4/18). Ancef antibiotics due to allergy, antibiotic to start when in active labor per MD.

## 2025-04-18 NOTE — PLAN OF CARE
Problem: Adult Inpatient Plan of Care  Goal: Optimal Comfort and Wellbeing  Outcome: Progressing  Intervention: Provide Person-Centered Care  Recent Flowsheet Documentation  Taken 4/18/2025 0152 by Laurie Cali RN  Trust Relationship/Rapport:   care explained   choices provided   emotional support provided   empathic listening provided   questions answered   questions encouraged   reassurance provided   thoughts/feelings acknowledged  Taken 4/17/2025 2121 by Laurie Cali RN  Trust Relationship/Rapport:   care explained   choices provided   emotional support provided   empathic listening provided   questions answered   questions encouraged   reassurance provided   thoughts/feelings acknowledged     Problem: Labor  Goal: Stable Fetal Wellbeing  Outcome: Progressing  Intervention: Promote and Monitor Fetal Wellbeing  Recent Flowsheet Documentation  Taken 4/18/2025 0152 by Laurie Cali RN  Body Position: position changed independently  Fetal Wellbeing Promotion:   fetal heart rate monitored   uterine contraction activity assessed  Taken 4/17/2025 2121 by Laurie Cali RN  Body Position: position changed independently  Fetal Wellbeing Promotion:   fetal heart rate monitored   uterine contraction activity assessed     Problem: Diabetes in Pregnancy  Goal: Blood Glucose Level Within Targeted Range  Outcome: Progressing  Intervention: Monitor and Manage Glycemia  Recent Flowsheet Documentation  Taken 4/18/2025 0152 by Laurie Cali RN  Fetal Wellbeing Promotion:   fetal heart rate monitored   uterine contraction activity assessed  Taken 4/17/2025 2121 by Laurie Cali RN  Fetal Wellbeing Promotion:   fetal heart rate monitored   uterine contraction activity assessed   Goal Outcome Evaluation:      Plan of Care Reviewed With: patient    Overall Patient Progress: improvingOverall Patient Progress: improving     VSS, , category 1 tracing. Dayna has signal issues at times resulting in artifact and going  to maternal rate. Pt denies pain, denies feeling ctx, states has a little cramping occasionally. BG was 138 after dinner yesterday. Cytotec given po, 3 doses were not given until IOL was ok'd and med was completed with 3 remaining doses to be given. Dr JOSÉ MIGUEL Merino called and wants the full 12 doses to be administered. Message was sent to pharmacy and those doses were added back on. Stable, NAD. Pt shown the nutrition rm and was encouraged to use it.

## 2025-04-18 NOTE — PROGRESS NOTES
0800: SVE done by MD 2-3/40%/-3, soft, posterior     Dr. Merino at bedside discussing plan of care with patient

## 2025-04-18 NOTE — PROGRESS NOTES
Wireless monitor reapplied after patient shower. Cat 1 tracing since 0730. Cervidil placed at 1127. Patient feeling contractions intermittently, mild by palpation.

## 2025-04-18 NOTE — PLAN OF CARE
Problem: Adult Inpatient Plan of Care  Goal: Plan of Care Review  Description: The Plan of Care Review/Shift note should be completed every shift.  The Outcome Evaluation is a brief statement about your assessment that the patient is improving, declining, or no change.  This information will be displayed automatically on your shiftnote.  Outcome: Progressing  Flowsheets (Taken 4/18/2025 1852)  Outcome Evaluation: Cervidil placed at 1127. Wireless monitor on patient, contraction irregular 4-8 minutes, palpating mild. Patient has been quiet all day. BS stable, VSS.  Plan of Care Reviewed With: patient  Overall Patient Progress: improving     Problem: Labor  Goal: Stable Fetal Wellbeing  Outcome: Progressing  Intervention: Promote and Monitor Fetal Wellbeing  Recent Flowsheet Documentation  Taken 4/18/2025 1732 by Monica Henson RN  Body Position: position changed independently  Taken 4/18/2025 0900 by Monica Henson, RN  Body Position: position changed independently  Goal: Effective Progression to Delivery  Outcome: Progressing   Goal Outcome Evaluation:      Plan of Care Reviewed With: patient    Overall Patient Progress: improvingOverall Patient Progress: improving    Outcome Evaluation: Cervidil placed at 1127. Wireless monitor on patient, contraction irregular 4-8 minutes, palpating mild. Patient has been quiet all day. BS stable, VSS.

## 2025-04-19 ENCOUNTER — ANESTHESIA (OUTPATIENT)
Dept: OBGYN | Facility: HOSPITAL | Age: 35
End: 2025-04-19

## 2025-04-19 ENCOUNTER — ANESTHESIA EVENT (OUTPATIENT)
Dept: OBGYN | Facility: HOSPITAL | Age: 35
End: 2025-04-19

## 2025-04-19 LAB
GLUCOSE BLDC GLUCOMTR-MCNC: 102 MG/DL (ref 70–99)
GLUCOSE BLDC GLUCOMTR-MCNC: 118 MG/DL (ref 70–99)

## 2025-04-19 PROCEDURE — 258N000003 HC RX IP 258 OP 636: Performed by: FAMILY MEDICINE

## 2025-04-19 PROCEDURE — 0HQ9XZZ REPAIR PERINEUM SKIN, EXTERNAL APPROACH: ICD-10-PCS | Performed by: FAMILY MEDICINE

## 2025-04-19 PROCEDURE — 120N000001 HC R&B MED SURG/OB

## 2025-04-19 PROCEDURE — 00HU33Z INSERTION OF INFUSION DEVICE INTO SPINAL CANAL, PERCUTANEOUS APPROACH: ICD-10-PCS | Performed by: PAIN MEDICINE

## 2025-04-19 PROCEDURE — 250N000011 HC RX IP 250 OP 636: Mod: JZ | Performed by: PAIN MEDICINE

## 2025-04-19 PROCEDURE — 10907ZC DRAINAGE OF AMNIOTIC FLUID, THERAPEUTIC FROM PRODUCTS OF CONCEPTION, VIA NATURAL OR ARTIFICIAL OPENING: ICD-10-PCS | Performed by: FAMILY MEDICINE

## 2025-04-19 PROCEDURE — 370N000003 HC ANESTHESIA WARD SERVICE: Performed by: PAIN MEDICINE

## 2025-04-19 PROCEDURE — 250N000013 HC RX MED GY IP 250 OP 250 PS 637: Performed by: FAMILY MEDICINE

## 2025-04-19 PROCEDURE — 3E0R3BZ INTRODUCTION OF ANESTHETIC AGENT INTO SPINAL CANAL, PERCUTANEOUS APPROACH: ICD-10-PCS | Performed by: PAIN MEDICINE

## 2025-04-19 PROCEDURE — 250N000009 HC RX 250: Performed by: FAMILY MEDICINE

## 2025-04-19 PROCEDURE — 999N000248 HC STATISTIC IV INSERT WITH US BY RN

## 2025-04-19 PROCEDURE — 250N000011 HC RX IP 250 OP 636: Performed by: PAIN MEDICINE

## 2025-04-19 PROCEDURE — 250N000011 HC RX IP 250 OP 636: Mod: JZ | Performed by: FAMILY MEDICINE

## 2025-04-19 PROCEDURE — 722N000001 HC LABOR CARE VAGINAL DELIVERY SINGLE

## 2025-04-19 PROCEDURE — 3E0P7VZ INTRODUCTION OF HORMONE INTO FEMALE REPRODUCTIVE, VIA NATURAL OR ARTIFICIAL OPENING: ICD-10-PCS | Performed by: FAMILY MEDICINE

## 2025-04-19 RX ORDER — DEXTROSE MONOHYDRATE 25 G/50ML
25-50 INJECTION, SOLUTION INTRAVENOUS
Status: DISCONTINUED | OUTPATIENT
Start: 2025-04-19 | End: 2025-04-19

## 2025-04-19 RX ORDER — HYDROCORTISONE 25 MG/G
CREAM TOPICAL 3 TIMES DAILY PRN
Status: DISCONTINUED | OUTPATIENT
Start: 2025-04-19 | End: 2025-04-21 | Stop reason: HOSPADM

## 2025-04-19 RX ORDER — FENTANYL CITRATE-0.9 % NACL/PF 10 MCG/ML
100 PLASTIC BAG, INJECTION (ML) INTRAVENOUS EVERY 5 MIN PRN
Status: DISCONTINUED | OUTPATIENT
Start: 2025-04-19 | End: 2025-04-19 | Stop reason: HOSPADM

## 2025-04-19 RX ORDER — MISOPROSTOL 200 UG/1
400 TABLET ORAL
Status: DISCONTINUED | OUTPATIENT
Start: 2025-04-19 | End: 2025-04-21 | Stop reason: HOSPADM

## 2025-04-19 RX ORDER — ACETAMINOPHEN 325 MG/1
650 TABLET ORAL EVERY 4 HOURS PRN
Status: DISCONTINUED | OUTPATIENT
Start: 2025-04-19 | End: 2025-04-21 | Stop reason: HOSPADM

## 2025-04-19 RX ORDER — NICOTINE POLACRILEX 4 MG
15-30 LOZENGE BUCCAL
Status: DISCONTINUED | OUTPATIENT
Start: 2025-04-19 | End: 2025-04-21 | Stop reason: HOSPADM

## 2025-04-19 RX ORDER — LIDOCAINE 40 MG/G
CREAM TOPICAL
Status: DISCONTINUED | OUTPATIENT
Start: 2025-04-19 | End: 2025-04-19 | Stop reason: HOSPADM

## 2025-04-19 RX ORDER — MISOPROSTOL 200 UG/1
800 TABLET ORAL
Status: DISCONTINUED | OUTPATIENT
Start: 2025-04-19 | End: 2025-04-21 | Stop reason: HOSPADM

## 2025-04-19 RX ORDER — NICOTINE POLACRILEX 4 MG
15-30 LOZENGE BUCCAL
Status: DISCONTINUED | OUTPATIENT
Start: 2025-04-19 | End: 2025-04-19

## 2025-04-19 RX ORDER — POLYETHYLENE GLYCOL 3350 17 G/17G
17 POWDER, FOR SOLUTION ORAL DAILY PRN
Status: DISCONTINUED | OUTPATIENT
Start: 2025-04-19 | End: 2025-04-21 | Stop reason: HOSPADM

## 2025-04-19 RX ORDER — METHYLERGONOVINE MALEATE 0.2 MG/ML
200 INJECTION INTRAVENOUS
Status: DISCONTINUED | OUTPATIENT
Start: 2025-04-19 | End: 2025-04-21 | Stop reason: HOSPADM

## 2025-04-19 RX ORDER — TRANEXAMIC ACID 10 MG/ML
1 INJECTION, SOLUTION INTRAVENOUS EVERY 30 MIN PRN
Status: DISCONTINUED | OUTPATIENT
Start: 2025-04-19 | End: 2025-04-21 | Stop reason: HOSPADM

## 2025-04-19 RX ORDER — LIDOCAINE HYDROCHLORIDE 10 MG/ML
INJECTION, SOLUTION EPIDURAL; INFILTRATION; INTRACAUDAL; PERINEURAL
Status: COMPLETED
Start: 2025-04-19 | End: 2025-04-19

## 2025-04-19 RX ORDER — SODIUM CHLORIDE, SODIUM LACTATE, POTASSIUM CHLORIDE, CALCIUM CHLORIDE 600; 310; 30; 20 MG/100ML; MG/100ML; MG/100ML; MG/100ML
INJECTION, SOLUTION INTRAVENOUS CONTINUOUS PRN
Status: DISCONTINUED | OUTPATIENT
Start: 2025-04-19 | End: 2025-04-19 | Stop reason: HOSPADM

## 2025-04-19 RX ORDER — OXYTOCIN 10 [USP'U]/ML
10 INJECTION, SOLUTION INTRAMUSCULAR; INTRAVENOUS
Status: DISCONTINUED | OUTPATIENT
Start: 2025-04-19 | End: 2025-04-21 | Stop reason: HOSPADM

## 2025-04-19 RX ORDER — DEXTROSE MONOHYDRATE 25 G/50ML
25-50 INJECTION, SOLUTION INTRAVENOUS
Status: DISCONTINUED | OUTPATIENT
Start: 2025-04-19 | End: 2025-04-21 | Stop reason: HOSPADM

## 2025-04-19 RX ORDER — CARBOPROST TROMETHAMINE 250 UG/ML
250 INJECTION, SOLUTION INTRAMUSCULAR
Status: DISCONTINUED | OUTPATIENT
Start: 2025-04-19 | End: 2025-04-21 | Stop reason: HOSPADM

## 2025-04-19 RX ORDER — AMOXICILLIN 250 MG
2 CAPSULE ORAL
Status: DISCONTINUED | OUTPATIENT
Start: 2025-04-19 | End: 2025-04-21 | Stop reason: HOSPADM

## 2025-04-19 RX ORDER — BISACODYL 10 MG
10 SUPPOSITORY, RECTAL RECTAL DAILY PRN
Status: DISCONTINUED | OUTPATIENT
Start: 2025-04-19 | End: 2025-04-21 | Stop reason: HOSPADM

## 2025-04-19 RX ORDER — OXYTOCIN/0.9 % SODIUM CHLORIDE 30/500 ML
1-24 PLASTIC BAG, INJECTION (ML) INTRAVENOUS CONTINUOUS
Status: DISCONTINUED | OUTPATIENT
Start: 2025-04-19 | End: 2025-04-19 | Stop reason: HOSPADM

## 2025-04-19 RX ORDER — DEXTROSE MONOHYDRATE 100 MG/ML
INJECTION, SOLUTION INTRAVENOUS CONTINUOUS PRN
Status: DISCONTINUED | OUTPATIENT
Start: 2025-04-19 | End: 2025-04-19

## 2025-04-19 RX ORDER — OXYTOCIN/0.9 % SODIUM CHLORIDE 30/500 ML
340 PLASTIC BAG, INJECTION (ML) INTRAVENOUS CONTINUOUS PRN
Status: DISCONTINUED | OUTPATIENT
Start: 2025-04-19 | End: 2025-04-21 | Stop reason: HOSPADM

## 2025-04-19 RX ORDER — NALBUPHINE HYDROCHLORIDE 20 MG/ML
2.5-5 INJECTION INTRAMUSCULAR; INTRAVENOUS; SUBCUTANEOUS EVERY 6 HOURS PRN
Status: DISCONTINUED | OUTPATIENT
Start: 2025-04-19 | End: 2025-04-19

## 2025-04-19 RX ORDER — SODIUM CHLORIDE 9 MG/ML
INJECTION, SOLUTION INTRAVENOUS CONTINUOUS PRN
Status: DISCONTINUED | OUTPATIENT
Start: 2025-04-19 | End: 2025-04-19

## 2025-04-19 RX ORDER — FENTANYL/ROPIVACAINE/NS/PF 2MCG/ML-.1
PLASTIC BAG, INJECTION (ML) EPIDURAL
Status: DISCONTINUED | OUTPATIENT
Start: 2025-04-19 | End: 2025-04-19 | Stop reason: HOSPADM

## 2025-04-19 RX ORDER — LOPERAMIDE HYDROCHLORIDE 2 MG/1
2 CAPSULE ORAL
Status: DISCONTINUED | OUTPATIENT
Start: 2025-04-19 | End: 2025-04-21 | Stop reason: HOSPADM

## 2025-04-19 RX ORDER — LOPERAMIDE HYDROCHLORIDE 2 MG/1
4 CAPSULE ORAL
Status: DISCONTINUED | OUTPATIENT
Start: 2025-04-19 | End: 2025-04-21 | Stop reason: HOSPADM

## 2025-04-19 RX ORDER — IBUPROFEN 800 MG/1
800 TABLET, FILM COATED ORAL EVERY 6 HOURS PRN
Status: DISCONTINUED | OUTPATIENT
Start: 2025-04-19 | End: 2025-04-21 | Stop reason: HOSPADM

## 2025-04-19 RX ADMIN — KETOROLAC TROMETHAMINE 15 MG: 15 INJECTION, SOLUTION INTRAMUSCULAR; INTRAVENOUS at 16:29

## 2025-04-19 RX ADMIN — CEFAZOLIN 1 G: 1 INJECTION, POWDER, FOR SOLUTION INTRAMUSCULAR; INTRAVENOUS at 13:55

## 2025-04-19 RX ADMIN — CEFAZOLIN 1 G: 1 INJECTION, POWDER, FOR SOLUTION INTRAMUSCULAR; INTRAVENOUS at 06:47

## 2025-04-19 RX ADMIN — Medication 2 MILLI-UNITS/MIN: at 04:35

## 2025-04-19 RX ADMIN — PSYLLIUM HUSK 1 PACKET: 3.4 POWDER ORAL at 21:05

## 2025-04-19 RX ADMIN — FENTANYL CITRATE 100 MCG: 50 INJECTION, SOLUTION INTRAMUSCULAR; INTRAVENOUS at 11:56

## 2025-04-19 RX ADMIN — LIDOCAINE HYDROCHLORIDE 20 ML: 10 INJECTION, SOLUTION EPIDURAL; INFILTRATION; INTRACAUDAL; PERINEURAL at 15:40

## 2025-04-19 RX ADMIN — SODIUM CHLORIDE, SODIUM LACTATE, POTASSIUM CHLORIDE, AND CALCIUM CHLORIDE: .6; .31; .03; .02 INJECTION, SOLUTION INTRAVENOUS at 14:52

## 2025-04-19 RX ADMIN — LIDOCAINE HYDROCHLORIDE 5 ML: 20 INJECTION, SOLUTION EPIDURAL; INFILTRATION; INTRACAUDAL; PERINEURAL at 12:18

## 2025-04-19 RX ADMIN — TRANEXAMIC ACID 1 G: 10 INJECTION, SOLUTION INTRAVENOUS at 15:08

## 2025-04-19 RX ADMIN — Medication: at 12:22

## 2025-04-19 ASSESSMENT — ACTIVITIES OF DAILY LIVING (ADL)
ADLS_ACUITY_SCORE: 26
ADLS_ACUITY_SCORE: 26
ADLS_ACUITY_SCORE: 33
ADLS_ACUITY_SCORE: 26
ADLS_ACUITY_SCORE: 34
ADLS_ACUITY_SCORE: 34
ADLS_ACUITY_SCORE: 26
ADLS_ACUITY_SCORE: 26
ADLS_ACUITY_SCORE: 34
ADLS_ACUITY_SCORE: 26

## 2025-04-19 NOTE — ANESTHESIA POSTPROCEDURE EVALUATION
Patient: Amanda Whiting    Procedure: * No procedures listed *       Anesthesia Type:  Epidural    Note:  Disposition: Inpatient   Postop Pain Control: Uneventful            Sign Out: Well controlled pain   PONV: No   Neuro/Psych: Uneventful            Sign Out: Acceptable/Baseline neuro status   Airway/Respiratory: Uneventful            Sign Out: Acceptable/Baseline resp. status   CV/Hemodynamics: Uneventful            Sign Out: Acceptable CV status; No obvious hypovolemia; No obvious fluid overload   Other NRE: NONE   DID A NON-ROUTINE EVENT OCCUR? No           Last vitals:  Vitals:    04/19/25 1445 04/19/25 1500 04/19/25 1511   BP: 123/57 139/63 134/85   Pulse:      Resp:      Temp:      SpO2: 98% 98%        Electronically Signed By: Nyasia Hannah MD  April 19, 2025  5:48 PM

## 2025-04-19 NOTE — PROGRESS NOTES
Notified by charge RN that, while at bedside to reposition patient, noted swelling in IV arm, presumed extravisation. IV fluids stopped, PICC team requested for replacement.     IV fluids resumed at 1334.

## 2025-04-19 NOTE — ANESTHESIA PROCEDURE NOTES
"Epidural catheter Procedure Note    Pre-Procedure   Staff -        Anesthesiologist:  Nyasia Hannah MD       Performed By: anesthesiologist       Location: OB       Procedure Start/Stop Times: 4/19/2025 12:10 PM and 4/19/2025 12:20 PM       Pre-Anesthestic Checklist: patient identified, IV checked, risks and benefits discussed, informed consent, monitors and equipment checked, pre-op evaluation, at physician/surgeon's request and post-op pain management  Timeout:       Correct Patient: Yes        Correct Procedure: Yes        Correct Site: Yes        Correct Position: Yes   Procedure Documentation  Procedure: epidural catheter         Patient Position: sitting       Patient Prep/Sterile Barriers: sterile gloves, mask, patient draped       Skin prep: Chloraprep       Local skin infiltrated with 3 mL of 1% lidocaine.        Insertion Site: L3-4. (midline approach).       Technique: LORT air        HYACINTH at 8 cm.       Needle Type: Touhy needle       Needle Gauge: 17.        Needle Length (Inches): 3.5        Catheter: 19 G.          Catheter threaded easily.           Threaded 15 cm at skin.         # of attempts: 1 and  # of redirects:  0    Assessment/Narrative         Paresthesias: No.       Test dose of 3 mL lidocaine 1.5% w/ 1:200,000 epinephrine at 12:14 CDT.         Test dose negative, 3 minutes after injection, for signs of intravascular, subdural, or intrathecal injection.       Insertion/Infusion Method: LORT air       Aspiration negative for Heme or CSF via Epidural Catheter.    Medication(s) Administered   Lidocaine 2% (Epidural) - EPIDURAL   5 mL - 4/19/2025 12:18:00 PM  Medication Administration Time: 4/19/2025 12:10 PM      FOR [facility] ([facility] [place]) ONLY:   Pain Team Contact information: please page the Pain Team Via [employer]. Search \"Pain\". During daytime hours, please page the attending first. At night please page the resident first.      "

## 2025-04-19 NOTE — L&D DELIVERY NOTE
[facility] Delivery Summary  [facility] Maternity Care  Date of Service: 2025    Name      Amanda Whiting         1990  MRN       [id number]  PCP        Vivianherlindarand Estuardo     DELIVERY NARRATIVE    On 2025 Amanda Whiting delivered a viable male infant at 37w2d with apgars of 8 and 9 via Vaginal, SpontaneousDelivery.    Amanda presented to Maternity Care on 2025 with a Bishops of 4 and a history of insufficient prenatal care, diet controlled GDM of unknown control and social issues such as transportation and  problems. Her group B Strep (GBS) carrier status was positive. She received 0 doses of  intrapartum doses of IV penicillin and 2 doses of  intrapartum doses IV cefazolin.. She received misoprostol, cervedil and pitocin for induction/augmentation. She had full doses of each and this AM was begun on pitocin initially low dose and then a max of 20 mU.    Her blood sugars were all at goal or very close and she required no insulin.    Delivery was via vaginal, spontaneous to a sterile field under epidural anesthesia. Infant delivered in vertex left occiput anterior position. Shoulders delivered without difficulty. The baby was placed on the patient's abdomen.  Cord complications: none. Delayed cord clamping was performed.  The cord was doubly clamped and cut 3 vessels were noted.     Placenta delivered at 2025  3:35 PM. Placental disposition was Hospital disposal. Fundal massage performed and fundus found to be  firm. The following uterotonics were given: Pitocin (IV). Perineum, vagina, cervix were inspected, and the following lacerations were noted: labial . Lacerations were repaired in the usual fashion using 3-0 Vicryl and 4-0 Vicryl. QBL: 0 mL.    Excellent hemostasis was noted. Needle and sponge count correct. Infant and patient in delivery room in good and stable condition.   _________________    GA: 37w2d  GP:   Labor  "Complications:    Additional Complications:    QBL:    Delivery Type: Vaginal, Spontaneous  Duration of Ruptured Membranes: 0h 21m  GBS Status:   No results found for: \"GBPCRT\"    Weight: 3.16 kg (6 lb 15.5 oz)  Apgar scores: 8 , 9         Woodrow Whiting [[id number]]      Labor Event Times      Active labor onset date: 25 Onset time: 12:15 PM   Dilation complete date: 25 Complete time:  3:08 PM   Start pushing date/time: 2025 1512          Labor Events     labor?: No   steroids: None  Labor Type: Induction/Cervical ripening  Predominate monitoring during 1st stage: continuous electronic fetal monitoring     Antibiotics received during labor?: Yes  Reason for Antibiotics: GBS  Antibiotics received for GBS: Cefazolin  Antibiotics Given (GBS): Less than or equal to 4 hours prior to delivery       Rupture date/time: 25 1508   Rupture type: Artificial Rupture of Membranes  Fluid color: Pink  Fluid odor: Normal     Induction: Misoprostol, Cervidil, Oxytocin  Induction date/time:      Cervical ripening date/time: 25 0929    Indications for induction: Diabetes (Comment to specify), Other Pregnant Patient Indications (Comment to specify)       Delivery/Placenta Date and Time      Delivery Date: 25 Delivery Time:  3:29 PM   Placenta Date/Time: 2025  3:35 PM  Oxytocin given at the time of delivery: after delivery of baby  Delivering clinician: Jules Merino MD   Other personnel present at delivery:  Provider Role   Delmi Lofton RN Registered Nurse   Laine Wu RN Registered Nurse             Vaginal Counts       Initial count performed by 2 team members:  Two Team Members   Dr. Wilber Lofton RN         Needles Suture Needles Sponges (RETIRED) Instruments   Initial counts 0 0 0    Added to count 1 1 5    Relief counts       Final counts               Placed during labor Accounted for at the end of labor   FSE No NA   IUPC No NA " "  Cervidil No NA                             Apgars    Living status: Living   1 Minute 5 Minute 10 Minute 15 Minute 20 Minute   Skin color: 0  1       Heart rate: 2  2       Reflex irritability: 2  2       Muscle tone: 2  2       Respiratory effort: 2  2       Total: 8  9       Apgars assigned by: DELMI LOFTON RN       Cord      Vessels: 3 Vessels    Cord Complications: None               Cord Blood Disposition: Discard    Gases Sent?: No    Delayed cord clamping?: Yes    Cord Clamping Delay (seconds):  seconds            Resuscitation    Methods: None        Measurements      Weight: 6 lb 15.5 oz Length: 1' 7.5\"     Head circumference: 34.9 cm           Labor Events and Shoulder Dystocia    Fetal Tracing Prior to Delivery: Category 1  Shoulder dystocia present?: Neg       Delivery (Maternal) (Provider to Complete) (401641)    Episiotomy: None  Perineal lacerations: None       Repaired?: Yes   Repair suture: 4-0 Vicryl  Genital tract inspection done: Pos       Blood Loss  Mother: Amanda Whiting #[id number]     Start of Mother's Information      Delivery Blood Loss   Intrapartum & Postpartum: 25 1215 - 25 1622    Delivery Admission: 25 2047 - 25 1622         Intrapartum & Postpartum Delivery Admission    None                  End of Mother's Information  Mother: Amanda Whiting #[id number]                Delivery - Provider to Complete ([id number])    Delivering clinician: Jules Merino MD  Delivery Type (Choose the 1 that will go to the Birth History): Vaginal, Spontaneous                         Other personnel:  Provider Role   Delmi Lofton RN Registered Nurse   Laine Wu RN Registered Nurse                    Placenta    Date/Time: 2025  3:35 PM  Removal: Spontaneous  Disposition: Hospital disposal             Anesthesia    Method: Epidural                    Presentation and Position    Presentation: Vertex    Position: Left Occiput " Anterior                     Completed by:   Jules Merino MD, MD  [facility]  4/19/2025 4:22 PM

## 2025-04-19 NOTE — PROGRESS NOTES
D:  Patient admitted to [facility]/[id number] via wheelchair with .  A:  Bedside report received from Delmi TOLENTINO RN. Oriented patient and family to surroundings; call light within reach. 4 Part ID bands double checked with reporting RN.  R:  Patient and  tolerated transfer. Care assumed.

## 2025-04-19 NOTE — PROGRESS NOTES
Dr. Merino called the unit for an update. Plan for the evening is to remove cervidil when time, encourage patient to rest till 3-4am, if zepeda score >/=7 start 2x2 pit, if <7 start 1x1 pit. Orders sign and held.

## 2025-04-19 NOTE — ANESTHESIA PREPROCEDURE EVALUATION
Anesthesia Pre-Procedure Evaluation    Patient: Amanda Whiting   MRN: [id number] : 1990        Procedure :           Past Medical History:   Diagnosis Date    Uncomplicated asthma       History reviewed. No pertinent surgical history.   Allergies   Allergen Reactions    Bee Pollens [Bee Pollen] Unknown    Bee Venom      BREATHING DIFFICULTY    Penicillins Unknown      Social History     Tobacco Use    Smoking status: Former     Types: Cigarettes    Smokeless tobacco: Not on file   Substance Use Topics    Alcohol use: Not Currently      Wt Readings from Last 1 Encounters:   25 94.8 kg (209 lb)        Anesthesia Evaluation            ROS/MED HX  ENT/Pulmonary:     (+)                      asthma                  Neurologic:  - neg neurologic ROS     Cardiovascular:  - neg cardiovascular ROS     METS/Exercise Tolerance:     Hematologic:  - neg hematologic  ROS     Musculoskeletal:       GI/Hepatic:  - neg GI/hepatic ROS     Renal/Genitourinary:       Endo:     (+)               Obesity,   gestational diabetes,    Psychiatric/Substance Use:  - neg psychiatric ROS     Infectious Disease:       Malignancy:       Other:            Physical Exam    Airway        Mallampati: III   TM distance: > 3 FB   Neck ROM: full   Mouth opening: > 3 cm    Respiratory Devices and Support         Dental  no notable dental history         Cardiovascular   cardiovascular exam normal          Pulmonary   pulmonary exam normal                OUTSIDE LABS:  CBC:   Lab Results   Component Value Date    WBC 9.4 2025    WBC 8.9 2025    HGB 11.1 (L) 2025    HGB 11.2 (L) 2025    HCT 33.2 (L) 2025    HCT 32.1 (L) 2025     2025     2025     BMP:   Lab Results   Component Value Date     2023     2023    POTASSIUM 4.2 2023    POTASSIUM 4.0 2023    CHLORIDE 102 2023    CHLORIDE 103 2023    CO2 25 2023    CO2 25 2023  "   BUN 5.8 (L) 12/05/2023    BUN 8.4 04/28/2023    CR 0.62 12/05/2023    CR 0.61 04/28/2023     (H) 04/19/2025     (H) 04/18/2025     COAGS: No results found for: \"PTT\", \"INR\", \"FIBR\"  POC: No results found for: \"BGM\", \"HCG\", \"HCGS\"  HEPATIC:   Lab Results   Component Value Date    ALBUMIN 4.3 12/05/2023    PROTTOTAL 7.5 12/05/2023     (H) 12/05/2023     (H) 12/05/2023    ALKPHOS 91 12/05/2023    BILITOTAL 0.4 12/05/2023     OTHER:   Lab Results   Component Value Date    A1C 5.0 04/16/2025    CELIO 9.2 12/05/2023    LIPASE 15 12/05/2023       Anesthesia Plan    ASA Status:  2       Anesthesia Type: Epidural.              Consents    Anesthesia Plan(s) and associated risks, benefits, and realistic alternatives discussed. Questions answered and patient/representative(s) expressed understanding.     - Discussed:     - Discussed with:  Patient            Postoperative Care            Comments:           neg OB ROS.      Nyasia Hannah MD    Clinically Significant Risk Factors                                  # Financial/Environmental Concerns:    # Housing Instability: noted in nursing assessment  # Asthma: noted on problem list          "

## 2025-04-19 NOTE — PROVIDER NOTIFICATION
MD updated on patient status - Patients reporting stronger contractions, strength now palpating moderate. Patient agreeable to cervical exam, essentially unchanged with fetal presenting part barely palpable, able manually dilate cervix to roughly 5-6 cm, 30% effaced.     Plan to continue Pitocin with a max of 24mu. RN encouraged regular patient repositioning, ambulation. Will update MD with changes. Planning prophylactic TXA prior to birth.

## 2025-04-19 NOTE — PROGRESS NOTES
Assuming care of pt after receiving report from Hui Chahal.   Informed that Cervidil was removed but no sve as pt opted to wait until 0400, when Pitocin scheduled to be initiated. .Dorcas Gonsales RN

## 2025-04-19 NOTE — PROGRESS NOTES
"LABOR PROGRESS NOTE  2025 7:51 AM    SUBJECTIVE:  Amanda received 12 doses of misoprostol and then 12 hours of cervidil. She did have nice progress to 4 cm early this AM before we begun the low dose pitocin. Baby has been cat.1 throughout the induction period. Mom denies any Has, blurred vision or significant swelling.     Currently she is having contractions every 2-5 minutes and she is not overly uncomfortable. Her pitocin is now 12 mU.    OBJECTIVE:  /75   Pulse 72   Temp 98.2  F (36.8  C) (Oral)   Resp 20   Ht 1.702 m (5' 7\")   Wt 94.8 kg (209 lb)   SpO2 97%   BMI 32.73 kg/m    FHR: 140s Category I - baseline FHR of 110 to 160 bpm, moderate FHR variability, and Absence of late or variable FHR decelerations  Uterine Contractions:  regular, every 2-5 minutes.  Cervix: dilation 5 cm , 50% effaced, soft, and -2 station.  Fluid: None noted.  Fetal Presentation: vertex.    ASSESSMENT & PLAN:   35 year old  at 37w2d here for induction for poorly controlled GDM, insufficient prenatal care and difficult social situation.  GDM has been at goal in the hospital with sugar being <140 postprandial   Escalate to full dose pitocin, 2 x 2, today and anticipate delviery    Completed by:   Jules Merino M.D.  [facility]  2025 7:51 AM  "

## 2025-04-19 NOTE — PLAN OF CARE
Problem: Adult Inpatient Plan of Care  Goal: Optimal Comfort and Wellbeing  Outcome: Progressing   Goal Outcome Evaluation:      Plan of Care Reviewed With: patient    Overall Patient Progress: improvingOverall Patient Progress: improving    Outcome Evaluation: SVE 4/60%/-3 and zepeda of 7. Pitocin started at 0435 and currently at 8mu/min. Patient reports feeling cramping and states is coping. Plan to start ancef for GBS prophalysis. Dr. Merino called and given update. Vitals wnl. Patient reports fob would like to be here for delivery.

## 2025-04-19 NOTE — PROGRESS NOTES
"LABOR PROGRESS NOTE  2025 1:50 PM    SUBJECTIVE:  I was called in due to increase pelvic pressure. She now has an epidural. Her contraction pattern is much better on 20 mU of pitocin. No new Sx.    OBJECTIVE:  /84   Pulse 72   Temp 98.2  F (36.8  C) (Oral)   Resp 20   Ht 1.702 m (5' 7\")   Wt 94.8 kg (209 lb)   SpO2 96%   BMI 32.73 kg/m    FHR: 140s Category I - baseline FHR of 110 to 160 bpm, moderate FHR variability, and Absence of late or variable FHR decelerations  Uterine Contractions:  regular, every 2-3 minutes.  Cervix: dilation 8.5 cm , 90% effaced, soft, and -2 station.  Fluid: None noted.  Fetal Presentation: vertex.    ASSESSMENT & PLAN:   35 year old  at 37w2d I active labor. Anticipate  once baby engages kecia effectively. Birthing ball might help  Diet controlled GDM, stable  GBS positive, receiving the second dose of Ancef  Anticipate using TXA and post delivery pitocin    Completed by:   Jules Merino M.D.  [facility]  2025 1:50 PM  "

## 2025-04-19 NOTE — PROGRESS NOTES
MD at bedside for second stage, continuous RN support at bedside.     Bedside report given. Care relinquished to Delmi HE RN.

## 2025-04-20 LAB — GLUCOSE BLDC GLUCOMTR-MCNC: 81 MG/DL (ref 70–99)

## 2025-04-20 PROCEDURE — 90471 IMMUNIZATION ADMIN: CPT | Performed by: FAMILY MEDICINE

## 2025-04-20 PROCEDURE — 90715 TDAP VACCINE 7 YRS/> IM: CPT | Performed by: FAMILY MEDICINE

## 2025-04-20 PROCEDURE — 250N000011 HC RX IP 250 OP 636: Performed by: FAMILY MEDICINE

## 2025-04-20 PROCEDURE — 250N000013 HC RX MED GY IP 250 OP 250 PS 637: Performed by: FAMILY MEDICINE

## 2025-04-20 PROCEDURE — 120N000001 HC R&B MED SURG/OB

## 2025-04-20 RX ADMIN — IBUPROFEN 800 MG: 800 TABLET ORAL at 07:55

## 2025-04-20 RX ADMIN — CLOSTRIDIUM TETANI TOXOID ANTIGEN (FORMALDEHYDE INACTIVATED), CORYNEBACTERIUM DIPHTHERIAE TOXOID ANTIGEN (FORMALDEHYDE INACTIVATED), BORDETELLA PERTUSSIS TOXOID ANTIGEN (GLUTARALDEHYDE INACTIVATED), BORDETELLA PERTUSSIS FILAMENTOUS HEMAGGLUTININ ANTIGEN (FORMALDEHYDE INACTIVATED), BORDETELLA PERTUSSIS PERTACTIN ANTIGEN, AND BORDETELLA PERTUSSIS FIMBRIAE 2/3 ANTIGEN 0.5 ML: 5; 2; 2.5; 5; 3; 5 INJECTION, SUSPENSION INTRAMUSCULAR at 20:46

## 2025-04-20 RX ADMIN — PSYLLIUM HUSK 1 PACKET: 3.4 POWDER ORAL at 09:12

## 2025-04-20 RX ADMIN — IBUPROFEN 800 MG: 800 TABLET ORAL at 01:42

## 2025-04-20 RX ADMIN — ACETAMINOPHEN 650 MG: 325 TABLET, FILM COATED ORAL at 12:31

## 2025-04-20 ASSESSMENT — ACTIVITIES OF DAILY LIVING (ADL)
ADLS_ACUITY_SCORE: 30

## 2025-04-20 NOTE — PROVIDER NOTIFICATION
Notified MD at 0829 AM regarding order clarification.      Spoke with: michelle Wilburn to change diet order.    Orders were obtained.    Comments: Patient requesting regular diet, AM blood sugar was 81.

## 2025-04-20 NOTE — PLAN OF CARE
Goal Outcome Evaluation:      Plan of Care Reviewed With: patient    Overall Patient Progress: improvingOverall Patient Progress: improving    Outcome Evaluation: brianne reports feeling exhausted and has appropriately asked for help intermittantly feeding infant throughout the night. her infnat is spitty and fussy and she has spent time toight rocking and comforting him. she has used PRN pain medications, heat packs, sitz baths, pillows, and rest to help manage her cramping. she has red irritated spots on her skin where the patches were from the tracy monitor.  kenny and birth cert at bedside. at this time she is sleeping comfortably and when she wakes; nursing will collect POCT fasting blood glucose.    Pt has passed her voiding trials and is emptying her bladder spontaneously.

## 2025-04-20 NOTE — PROGRESS NOTES
"Discussed with RN and reviewed chart.     Met with patient in post partum room, introduced self and role. Asked patient about baby supplies. Patient confirms having all necessary supplies at home including car seat, \"a lot of diapers\", wipes, clothes, and a safe place for new born to sleep. Patient reports need for formula. She confirms she is connected with [facility] and will be able to get ongoing formula through their program. Provided patient with available formula. Patient reports that her car seat is currently at home and does not have anyone who is able to go get it for her. She may be able to take an uber with a car seat home. Updated bedside RN.     After further chart review, met with patient again to discuss social situation. Patient previously reported to [employer] on 4/18 that her mother has custody of her other three children and that her mother is currently in inpatient treatment. Asked patient where her other children are currently staying; patient shares that they are staying with an aunt. Asked patient to tell me more about her mother having custody; patient shares that in 2018 she was \"living on the streets\" and wanted her kids to have a home, so she transferred custody to her mom. She shares that now, they all live together. Patient denies having any open child protection case. Acknowledged appreciation to patient for her openness in sharing this information.   [facility] called [facility] Child Protection (ph: [phone], option 1) to consult. Spoke with Kimberli. Kimberli reviewed records and confirmed that Amanda voluntarily transferred custody to her mother, Idania. Since there was not a termination of parental rights, there is no need for a report.         JUDY Lin on 04/20/25   "

## 2025-04-20 NOTE — PROGRESS NOTES
Data: Amanda Whiting transferred to [facility]/VDFA93-7 via wheelchair. Patient transferred to Postpartum with .    Action: Receiving unit notified of transfer. Patient and support person notified of room change. Patient and belongings accompanied by Registered Nurse. Bedside report given to Tamiko Suazo RN. Fundal check performed with receiving RN. Oriented patient to surroundings. Call light within reach.    Response: Patient tolerated transfer.    Delmi Lofton RN  2025 7:25 PM

## 2025-04-20 NOTE — DISCHARGE SUMMARY
Maternal Discharge Summary  [facility] Maternity Care  Date of Service: 2025    Name      Amanda Whiting         1990  MRN       [id number]  PCP        Jules Merino    Admit Date:  2025  Discharge Date:  2025    Principal Diagnosis:    Patient Active Problem List   Diagnosis    Supervision of high-risk pregnancy with insufficient prenatal care in third trimester    Vaginal delivery    Acute respiratory failure with hypoxia (H)    Exacerbation of asthma, unspecified asthma severity, unspecified whether persistent    Pregnant    Diet controlled gestational diabetes mellitus (GDM) in third trimester    Postpartum care and examination of lactating mother       Delivery Type: vaginal, spontaneous    Hospital Course:  Amanda Whiting is a 35 year old now  s/p vaginal, spontaneous at 37w2d on 25.  The patient's hospital course was unremarkable.  She recovered as anticipated and experienced no post-delivery complications.  On discharge, her pain was well controlled.  Vaginal bleeding is normal.  Voiding without difficulty.  Ambulating well and tolerating a normal diet.  No fevers.   She faces many social difficulties that require need to help with food and diapers for baby    Conditions complicating Pregnancy:  Other Complications/Significant Findings:  Very difficult social situation which places she and baby at risk to have adequate food and clothing/ diapers.  Social service has been involved    Procedure(s) Performed: none    Discharge Plan:   Discharge to Home. Condition at Discharge:  stable  Physical activity: Regular.  Diet:  Regular.  Home care nurse: ordered for 1-2 days from now.  Contraception plan: undecided, will follow up at postpartum visit.  Follow up with Jules Ling in 6 weeks.    Discharge Medications:   Current Discharge Medication List        CONTINUE these medications which have NOT CHANGED    Details   aspirin 81  "MG EC tablet Take 81 mg by mouth daily.      lactobacillus rhamnosus, GG, (CULTURELL) capsule Take 1 capsule by mouth 2 times daily.      Prenatal Vit-Fe Fumarate-FA (PRENATAL MULTIVITAMIN  PLUS IRON) 27-1 MG TABS Take 1 tablet by mouth daily.      albuterol (PROAIR HFA/PROVENTIL HFA/VENTOLIN HFA) 108 (90 Base) MCG/ACT inhaler Inhale 2 puffs into the lungs every 6 hours as needed for shortness of breath, wheezing or cough  Qty: 18 g, Refills: 1    Comments: Pharmacy may dispense brand covered by insurance (Proair, or proventil or ventolin or generic albuterol inhaler)  Associated Diagnoses: Exacerbation of asthma, unspecified asthma severity, unspecified whether persistent      fluticasone (FLOVENT DISKUS) 50 MCG/ACT inhaler Inhale 2 puffs into the lungs every 12 hours  Qty: 60 each, Refills: 1    Associated Diagnoses: Acute respiratory failure with hypoxia (H)             Allergies:   Allergies   Allergen Reactions    Bee Pollens [Bee Pollen] Unknown    Bee Venom      BREATHING DIFFICULTY    Penicillins Unknown       Discharge Exam:  /84 (BP Location: Right arm)   Pulse 98   Temp 98  F (36.7  C) (Oral)   Resp 16   Ht 1.702 m (5' 7\")   Wt 94.8 kg (209 lb)   SpO2 97%   Breastfeeding Unknown   BMI 32.73 kg/m    General - alert, comfortable  Heart - RRR, no murmurs  Lungs - CTA bilaterally  Abdomen - fundus firm, nontender, below umbilicus  Extremities - trace edema    Post-partum hemoglobin:   Hemoglobin   Date Value Ref Range Status   04/18/2025 11.1 (L) 11.7 - 15.7 g/dL Final       Greater than 30 minutes were spent on this discharge on the day of service.      Completed by:   Stacy Izaguirre MD  [facility]  4/20/2025 9:11 AM  "

## 2025-04-20 NOTE — PROGRESS NOTES
Dr. Izaguirre was called for Tdap order.  Patient's last recorded Tdap was in 2012.  Order was placed.

## 2025-04-20 NOTE — PLAN OF CARE
Goal Outcome Evaluation:      Plan of Care Reviewed With: patient    Overall Patient Progress: improvingOverall Patient Progress: improving       Problem: Adult Inpatient Plan of Care  Goal: Optimal Comfort and Wellbeing  Intervention: Monitor Pain and Promote Comfort  Recent Flowsheet Documentation  Taken 4/20/2025 1130 by Jelena Todd RN  Pain Management Interventions: medication offered but refused  Taken 4/20/2025 0755 by Jelena Todd RN  Pain Management Interventions: medication (see MAR)  Pt is taking prn pain medications as needed for pain management. Pt is up ambulating in the room independently. Vitals WNL's.

## 2025-04-20 NOTE — PLAN OF CARE
Problem: Adult Inpatient Plan of Care  Goal: Optimal Comfort and Wellbeing  Outcome: Progressing  Intervention: Provide Person-Centered Care  Recent Flowsheet Documentation  Taken 4/19/2025 1600 by Delmi Lofton RN  Trust Relationship/Rapport:   care explained   choices provided   emotional support provided   questions answered   empathic listening provided   questions encouraged   reassurance provided   thoughts/feelings acknowledged   Goal Outcome Evaluation:      Plan of Care Reviewed With: patient    Overall Patient Progress: improvingOverall Patient Progress: improving    Outcome Evaluation: Patient had vaginal delivery, qbl of 150 ml at delivery and currently at 235 ml. Patient flat affect but did smile here and there for writer. pt reports she lives with her mom currently.  patient reports she and Father of Baby are not currently involved.

## 2025-04-21 VITALS
HEIGHT: 67 IN | TEMPERATURE: 97.7 F | DIASTOLIC BLOOD PRESSURE: 64 MMHG | RESPIRATION RATE: 16 BRPM | WEIGHT: 200.7 LBS | SYSTOLIC BLOOD PRESSURE: 125 MMHG | OXYGEN SATURATION: 97 % | HEART RATE: 77 BPM | BODY MASS INDEX: 31.5 KG/M2

## 2025-04-21 PROCEDURE — 250N000013 HC RX MED GY IP 250 OP 250 PS 637: Performed by: FAMILY MEDICINE

## 2025-04-21 RX ADMIN — ACETAMINOPHEN 650 MG: 325 TABLET, FILM COATED ORAL at 00:06

## 2025-04-21 RX ADMIN — IBUPROFEN 800 MG: 800 TABLET ORAL at 00:06

## 2025-04-21 ASSESSMENT — ACTIVITIES OF DAILY LIVING (ADL)
ADLS_ACUITY_SCORE: 30

## 2025-04-21 NOTE — PROGRESS NOTES
D:  Patient desires discharge home.  Discharge orders received and entered by provider.  A:  Discharge instructions reviewed with the patient.  All questions and concerns addressed.  R:  Discharge criteria met.  4 Part ID bands double checked.  Cary discharged in car seat with mother.  The nursing assistant escorted patient to  taxi :      -Taxi called at 1420 that they were in the front entrance.   -At front entrance within 5 minutes with mother,  and all belongings.   -Taxi was not there.   -Called and company stated they would send another taxi within 9 minutes.   -Another taxi came but only had a booster seat.   -Mother and baby brought back to the hospital room.   - called for discharge plan to home.

## 2025-04-21 NOTE — PROGRESS NOTES
Patient stable to discharge home today.  Call received from RN, patient reports the infant car seat is at home, there is no one available to bring the car seat to her.  Called cab company ([phone], x2).  Inquired, they confirmed they are able to provide ride and bring infant car seat at  time.      Reviewed and discussed with nursing, advised of above.  Also met with patient and reviewed available transportation; confirmed address with her for scheduling ride.  Called cab company back, scheduled ride  time @2pm.  Requested and they will have  call RN station ([phone]) when they arrive.      JUDY Mansfield    Change in transportation, cabs that came to pick patient and infant up did not have infant car seat.  Called Transportation provider again ([phone]).  Requested and arranged three-way transportation:  [employer] cab to pick patient up at hospital, they will bring patient to her home and wait for her while she goes into her home to get the car seat; they will bring patient back, then get baby and cab will bring patient and infant home.  Reviewed with nursing and patient; provided patient with nursing station phone number.    3:41 PM

## 2025-04-21 NOTE — PLAN OF CARE
Goal Outcome Evaluation:     Plan of Care Reviewed With: patient    Overall Patient Progress: improvingOverall Patient Progress: improving    Outcome Evaluation: Discharge home today    Home today via taxi with car seat provided by taxi. Per mother she is unable to get her car seat to the hospital from her house and has no one to help bring her and baby home.  assisting with setting up ride.    Pain managed with PRN Tylenol and Ibuprofen.    Up independently. Voiding. Tolerating regular diet. Postpartum assessment WNL.    Paperwork completed on prior shifts.

## 2025-04-21 NOTE — PLAN OF CARE
Goal Outcome Evaluation: Progressing       Plan of Care Reviewed With: patient    Overall Patient Progress: improvingOverall Patient Progress: improving    Outcome Evaluation: Amanda's VSS and her PP assessments WNL.  She's voiding and ambulating without difficulty. she denies pain.  She needs reminders to drink water and ambulate.  She appears tired and has been watching television without a break.  Her affect is flat, and she has not shown initiative to care for herself or her baby this evening, tho' she has fed him.  Her initial plan was to leave hospital this evening, but she changed her mind and will discharge tomorrow.    Problem: Postpartum (Vaginal Delivery)  Goal: Hemostasis  Outcome: Progressing     Problem: Postpartum (Vaginal Delivery)  Goal: Effective Urinary Elimination  Intervention: Monitor and Manage Urinary Retention  Recent Flowsheet Documentation  Taken 4/20/2025 1615 by Priyanka Gleason, RN  Urinary Elimination Promotion: frequent voiding encouraged

## 2025-04-21 NOTE — PLAN OF CARE
Goal Outcome Evaluation:Vital signs stable. Postpartum assessment WDL. Pain managed with tylenol and motrin as needed for cramping pain. Patient voiding without difficulty. Exclusively formula feeding her infant. Patient and infant bonding well. Will continue with current plan of care.   Problem: Adult Inpatient Plan of Care  Goal: Optimal Comfort and Wellbeing  Outcome: Progressing   Problem: Adult Inpatient Plan of Care  Goal: Readiness for Transition of Care  Outcome: Progressing     Plan of Care Reviewed With: patient    Overall Patient Progress: improvingOverall Patient Progress: improving